# Patient Record
Sex: FEMALE | Race: ASIAN | NOT HISPANIC OR LATINO | Employment: FULL TIME | URBAN - METROPOLITAN AREA
[De-identification: names, ages, dates, MRNs, and addresses within clinical notes are randomized per-mention and may not be internally consistent; named-entity substitution may affect disease eponyms.]

---

## 2018-10-05 ENCOUNTER — OFFICE VISIT - HEALTHEAST (OUTPATIENT)
Dept: FAMILY MEDICINE | Facility: CLINIC | Age: 33
End: 2018-10-05

## 2018-10-05 DIAGNOSIS — Z71.84 TRAVEL ADVICE ENCOUNTER: ICD-10-CM

## 2018-10-05 DIAGNOSIS — R52 PAIN: ICD-10-CM

## 2018-10-05 ASSESSMENT — MIFFLIN-ST. JEOR: SCORE: 1026.9

## 2019-03-20 ENCOUNTER — COMMUNICATION - HEALTHEAST (OUTPATIENT)
Dept: FAMILY MEDICINE | Facility: CLINIC | Age: 34
End: 2019-03-20

## 2019-04-02 ENCOUNTER — OFFICE VISIT - HEALTHEAST (OUTPATIENT)
Dept: FAMILY MEDICINE | Facility: CLINIC | Age: 34
End: 2019-04-02

## 2019-04-02 DIAGNOSIS — Z11.1 SCREENING EXAMINATION FOR PULMONARY TUBERCULOSIS: ICD-10-CM

## 2019-04-02 ASSESSMENT — MIFFLIN-ST. JEOR: SCORE: 1024.07

## 2019-04-03 LAB
GAMMA INTERFERON BACKGROUND BLD IA-ACNC: 0.07 IU/ML
M TB IFN-G BLD-IMP: NEGATIVE
MITOGEN IGNF BCKGRD COR BLD-ACNC: -0.02 IU/ML
MITOGEN IGNF BCKGRD COR BLD-ACNC: 0.03 IU/ML
QTF INTERPRETATION: NORMAL
QTF MITOGEN - NIL: 6.68 IU/ML

## 2019-04-30 ENCOUNTER — OFFICE VISIT - HEALTHEAST (OUTPATIENT)
Dept: FAMILY MEDICINE | Facility: CLINIC | Age: 34
End: 2019-04-30

## 2019-04-30 DIAGNOSIS — B00.1 HERPES LABIALIS: ICD-10-CM

## 2019-10-19 ENCOUNTER — OFFICE VISIT - HEALTHEAST (OUTPATIENT)
Dept: FAMILY MEDICINE | Facility: CLINIC | Age: 34
End: 2019-10-19

## 2019-10-19 DIAGNOSIS — N30.00 ACUTE CYSTITIS WITHOUT HEMATURIA: ICD-10-CM

## 2019-10-19 DIAGNOSIS — R39.9 UTI SYMPTOMS: ICD-10-CM

## 2019-10-19 LAB
ALBUMIN UR-MCNC: ABNORMAL MG/DL
APPEARANCE UR: ABNORMAL
BACTERIA #/AREA URNS HPF: ABNORMAL HPF
BILIRUB UR QL STRIP: NEGATIVE
COLOR UR AUTO: YELLOW
GLUCOSE UR STRIP-MCNC: NEGATIVE MG/DL
HGB UR QL STRIP: ABNORMAL
KETONES UR STRIP-MCNC: NEGATIVE MG/DL
LEUKOCYTE ESTERASE UR QL STRIP: ABNORMAL
NITRATE UR QL: NEGATIVE
PH UR STRIP: 7.5 [PH] (ref 5–8)
RBC #/AREA URNS AUTO: ABNORMAL HPF
SP GR UR STRIP: 1.02 (ref 1–1.03)
SQUAMOUS #/AREA URNS AUTO: ABNORMAL LPF
UROBILINOGEN UR STRIP-ACNC: ABNORMAL
WBC #/AREA URNS AUTO: ABNORMAL HPF
WBC CLUMPS #/AREA URNS HPF: PRESENT /[HPF]

## 2019-10-19 ASSESSMENT — MIFFLIN-ST. JEOR: SCORE: 1043.63

## 2019-10-22 LAB
BACTERIA SPEC CULT: ABNORMAL
BACTERIA SPEC CULT: ABNORMAL

## 2020-01-31 ENCOUNTER — OFFICE VISIT - HEALTHEAST (OUTPATIENT)
Dept: FAMILY MEDICINE | Facility: CLINIC | Age: 35
End: 2020-01-31

## 2020-01-31 DIAGNOSIS — R10.84 ABDOMINAL PAIN, GENERALIZED: ICD-10-CM

## 2020-01-31 DIAGNOSIS — Z71.84 TRAVEL ADVICE ENCOUNTER: ICD-10-CM

## 2020-01-31 ASSESSMENT — MIFFLIN-ST. JEOR: SCORE: 1059.22

## 2020-02-29 ENCOUNTER — OFFICE VISIT - HEALTHEAST (OUTPATIENT)
Dept: FAMILY MEDICINE | Facility: CLINIC | Age: 35
End: 2020-02-29

## 2020-02-29 DIAGNOSIS — J06.9 VIRAL UPPER RESPIRATORY TRACT INFECTION: ICD-10-CM

## 2020-06-08 ENCOUNTER — OFFICE VISIT - HEALTHEAST (OUTPATIENT)
Dept: FAMILY MEDICINE | Facility: CLINIC | Age: 35
End: 2020-06-08

## 2020-06-08 ENCOUNTER — COMMUNICATION - HEALTHEAST (OUTPATIENT)
Dept: FAMILY MEDICINE | Facility: CLINIC | Age: 35
End: 2020-06-08

## 2020-06-08 DIAGNOSIS — N63.0 LUMP OR MASS IN BREAST: ICD-10-CM

## 2020-06-11 ENCOUNTER — HOSPITAL ENCOUNTER (OUTPATIENT)
Dept: MAMMOGRAPHY | Facility: CLINIC | Age: 35
Discharge: HOME | End: 2020-06-11
Attending: FAMILY MEDICINE

## 2020-06-11 DIAGNOSIS — N63.0 LUMP OR MASS IN BREAST: ICD-10-CM

## 2020-08-31 ENCOUNTER — OFFICE VISIT - HEALTHEAST (OUTPATIENT)
Dept: FAMILY MEDICINE | Facility: CLINIC | Age: 35
End: 2020-08-31

## 2020-08-31 DIAGNOSIS — Z23 NEED FOR PROPHYLACTIC VACCINATION AND INOCULATION AGAINST INFLUENZA: ICD-10-CM

## 2020-08-31 DIAGNOSIS — Z12.4 CERVICAL CANCER SCREENING: ICD-10-CM

## 2020-08-31 DIAGNOSIS — Z11.3 SCREEN FOR STD (SEXUALLY TRANSMITTED DISEASE): ICD-10-CM

## 2020-08-31 DIAGNOSIS — H65.91 RIGHT NON-SUPPURATIVE OTITIS MEDIA: ICD-10-CM

## 2020-08-31 DIAGNOSIS — N64.9 BREAST LESION: ICD-10-CM

## 2020-08-31 ASSESSMENT — MIFFLIN-ST. JEOR: SCORE: 1062.62

## 2020-09-01 LAB
HPV SOURCE: NORMAL
HUMAN PAPILLOMA VIRUS 16 DNA: NEGATIVE
HUMAN PAPILLOMA VIRUS 18 DNA: NEGATIVE
HUMAN PAPILLOMA VIRUS FINAL DIAGNOSIS: NORMAL
HUMAN PAPILLOMA VIRUS OTHER HR: NEGATIVE
SPECIMEN DESCRIPTION: NORMAL

## 2020-09-02 ENCOUNTER — HOSPITAL ENCOUNTER (OUTPATIENT)
Dept: MAMMOGRAPHY | Facility: CLINIC | Age: 35
Discharge: HOME | End: 2020-09-02
Attending: FAMILY MEDICINE

## 2020-09-02 DIAGNOSIS — N64.9 BREAST LESION: ICD-10-CM

## 2020-09-02 LAB
C TRACH DNA SPEC QL PROBE+SIG AMP: NEGATIVE
N GONORRHOEA DNA SPEC QL NAA+PROBE: NEGATIVE

## 2020-09-03 ENCOUNTER — AMBULATORY - HEALTHEAST (OUTPATIENT)
Dept: MAMMOGRAPHY | Facility: CLINIC | Age: 35
End: 2020-09-03

## 2020-09-03 DIAGNOSIS — Z11.59 ENCOUNTER FOR SCREENING FOR OTHER VIRAL DISEASES: ICD-10-CM

## 2020-09-12 ENCOUNTER — AMBULATORY - HEALTHEAST (OUTPATIENT)
Dept: FAMILY MEDICINE | Facility: CLINIC | Age: 35
End: 2020-09-12

## 2020-09-12 DIAGNOSIS — Z11.59 ENCOUNTER FOR SCREENING FOR OTHER VIRAL DISEASES: ICD-10-CM

## 2020-09-14 ENCOUNTER — COMMUNICATION - HEALTHEAST (OUTPATIENT)
Dept: SCHEDULING | Facility: CLINIC | Age: 35
End: 2020-09-14

## 2020-09-14 ENCOUNTER — OFFICE VISIT - HEALTHEAST (OUTPATIENT)
Dept: FAMILY MEDICINE | Facility: CLINIC | Age: 35
End: 2020-09-14

## 2020-09-14 DIAGNOSIS — G89.29 CHRONIC RIGHT EAR PAIN: ICD-10-CM

## 2020-09-14 DIAGNOSIS — H92.01 CHRONIC RIGHT EAR PAIN: ICD-10-CM

## 2020-09-14 ASSESSMENT — MIFFLIN-ST. JEOR: SCORE: 1059.22

## 2020-09-16 ENCOUNTER — HOSPITAL ENCOUNTER (OUTPATIENT)
Dept: MAMMOGRAPHY | Facility: CLINIC | Age: 35
Discharge: HOME | End: 2020-09-16
Attending: FAMILY MEDICINE

## 2020-09-16 DIAGNOSIS — N64.9 BREAST LESION: ICD-10-CM

## 2020-09-21 ENCOUNTER — COMMUNICATION - HEALTHEAST (OUTPATIENT)
Dept: FAMILY MEDICINE | Facility: CLINIC | Age: 35
End: 2020-09-21

## 2020-09-22 ENCOUNTER — COMMUNICATION - HEALTHEAST (OUTPATIENT)
Dept: MAMMOGRAPHY | Facility: CLINIC | Age: 35
End: 2020-09-22

## 2020-09-23 ENCOUNTER — COMMUNICATION - HEALTHEAST (OUTPATIENT)
Dept: MAMMOGRAPHY | Facility: CLINIC | Age: 35
End: 2020-09-23

## 2020-09-25 ENCOUNTER — OFFICE VISIT - HEALTHEAST (OUTPATIENT)
Dept: AUDIOLOGY | Facility: CLINIC | Age: 35
End: 2020-09-25

## 2020-09-25 ENCOUNTER — OFFICE VISIT - HEALTHEAST (OUTPATIENT)
Dept: OTOLARYNGOLOGY | Facility: CLINIC | Age: 35
End: 2020-09-25

## 2020-09-25 DIAGNOSIS — Z01.10 HEARING WITHIN NORMAL LIMITS IN BOTH EARS: ICD-10-CM

## 2020-09-25 DIAGNOSIS — H93.11 TINNITUS OF RIGHT EAR: ICD-10-CM

## 2020-09-25 DIAGNOSIS — H93.11 TINNITUS, RIGHT: ICD-10-CM

## 2020-09-28 LAB
LAB AP CHARGES (HE HISTORICAL CONVERSION): NORMAL
PATH REPORT.COMMENTS IMP SPEC: NORMAL
PATH REPORT.COMMENTS IMP SPEC: NORMAL
PATH REPORT.FINAL DX SPEC: NORMAL
PATH REPORT.GROSS SPEC: NORMAL
PATH REPORT.MICROSCOPIC SPEC OTHER STN: NORMAL
PATH REPORT.MICROSCOPIC SPEC OTHER STN: NORMAL
PATH REPORT.RELEVANT HX SPEC: NORMAL
RESULT FLAG (HE HISTORICAL CONVERSION): NORMAL

## 2020-09-29 ENCOUNTER — COMMUNICATION - HEALTHEAST (OUTPATIENT)
Dept: MAMMOGRAPHY | Facility: CLINIC | Age: 35
End: 2020-09-29

## 2020-09-30 ENCOUNTER — AMBULATORY - HEALTHEAST (OUTPATIENT)
Dept: FAMILY MEDICINE | Facility: CLINIC | Age: 35
End: 2020-09-30

## 2020-09-30 DIAGNOSIS — N63.0 BREAST MASS: ICD-10-CM

## 2020-10-05 ENCOUNTER — HOSPITAL ENCOUNTER (OUTPATIENT)
Dept: CT IMAGING | Facility: HOSPITAL | Age: 35
Discharge: HOME | End: 2020-10-05
Attending: OTOLARYNGOLOGY

## 2020-10-05 DIAGNOSIS — H93.11 TINNITUS, RIGHT: ICD-10-CM

## 2020-10-16 ENCOUNTER — COMMUNICATION - HEALTHEAST (OUTPATIENT)
Dept: SURGERY | Facility: CLINIC | Age: 35
End: 2020-10-16

## 2020-10-16 ENCOUNTER — HOSPITAL ENCOUNTER (OUTPATIENT)
Dept: SURGERY | Facility: CLINIC | Age: 35
Discharge: HOME | End: 2020-10-16
Attending: FAMILY MEDICINE

## 2020-10-16 ENCOUNTER — AMBULATORY - HEALTHEAST (OUTPATIENT)
Dept: SURGERY | Facility: AMBULATORY SURGERY CENTER | Age: 35
End: 2020-10-16

## 2020-10-16 DIAGNOSIS — Z11.59 ENCOUNTER FOR SCREENING FOR OTHER VIRAL DISEASES: ICD-10-CM

## 2020-10-16 DIAGNOSIS — N64.89 PSEUDOANGIOMATOUS STROMAL HYPERPLASIA OF BREAST: ICD-10-CM

## 2020-10-16 ASSESSMENT — MIFFLIN-ST. JEOR: SCORE: 1056.95

## 2020-10-26 ENCOUNTER — AMBULATORY - HEALTHEAST (OUTPATIENT)
Dept: LAB | Facility: CLINIC | Age: 35
End: 2020-10-26

## 2020-10-26 DIAGNOSIS — Z11.59 ENCOUNTER FOR SCREENING FOR OTHER VIRAL DISEASES: ICD-10-CM

## 2020-10-28 ENCOUNTER — COMMUNICATION - HEALTHEAST (OUTPATIENT)
Dept: SCHEDULING | Facility: CLINIC | Age: 35
End: 2020-10-28

## 2020-10-28 ASSESSMENT — MIFFLIN-ST. JEOR: SCORE: 1061.49

## 2020-10-29 ENCOUNTER — ANESTHESIA - HEALTHEAST (OUTPATIENT)
Dept: SURGERY | Facility: AMBULATORY SURGERY CENTER | Age: 35
End: 2020-10-29

## 2020-10-30 ENCOUNTER — SURGERY - HEALTHEAST (OUTPATIENT)
Dept: SURGERY | Facility: AMBULATORY SURGERY CENTER | Age: 35
End: 2020-10-30

## 2020-10-30 ASSESSMENT — MIFFLIN-ST. JEOR: SCORE: 1061.49

## 2020-11-11 ENCOUNTER — VIRTUAL VISIT (OUTPATIENT)
Dept: FAMILY MEDICINE | Facility: OTHER | Age: 35
End: 2020-11-11

## 2020-11-12 ENCOUNTER — AMBULATORY - HEALTHEAST (OUTPATIENT)
Dept: FAMILY MEDICINE | Facility: CLINIC | Age: 35
End: 2020-11-12

## 2020-11-12 DIAGNOSIS — Z20.822 SUSPECTED 2019 NOVEL CORONAVIRUS INFECTION: ICD-10-CM

## 2020-11-12 NOTE — PROGRESS NOTES
"Date: 2020 15:47:13  Clinician: Sisi Brooks  Clinician NPI: [id number]  Patient: Peterson Cormier  Patient : 1985  Patient Address: [address] [place] [address]  Patient Phone: [phone]  Visit Protocol: URI  Patient Summary:  Peterson is a 35 year old ( : 1985 ) female who initiated a [facility] Visit for COVID-19 (Coronavirus) evaluation and screening. When asked the question \"Please sign me up to receive news, health information and promotions from [facility].\", Peterson responded \"No\".    When asked when her symptoms started, Peterson reported that she does not have any symptoms.   She denies taking antibiotic medication in the past month and having recent facial or sinus surgery in the past 60 days.    Pertinent COVID-19 (Coronavirus) information  Peterson does not work or volunteer as healthcare worker or a . In the past 14 days, Peterson has not worked or volunteered at a healthcare facility or group living setting.   In the past 14 days, she also has not lived in a congregate living setting.   Peterson has had a close contact with a laboratory-confirmed COVID-19 patient in the last 14 days. She was exposed at her work. 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 I received a paper from the company that I was one of the close contact to the person that got COVID-19. I would like to get test if I an infected with it cause I have kids at home.   Peterson is not living in the same household with the COVID-19 positive patient. She was in an enclosed space for greater than 15 minutes with the COVID-19 patient.   During the encounter, both of them were wearing masks.   Since 2019, Peterson has been tested for COVID-19 and has not had upper respiratory " infection or influenza-like illness.      Result of COVID-19 test: Negative     Date of her COVID-19 test: 10/26/2020      Pertinent medical history  Peterson does not get yeast infections when she takes antibiotics.   Peterson needs a return to work/school note.   Weight: 105 lbs   Peterson does not smoke or use smokeless tobacco.   She denies pregnancy and denies breastfeeding. She has menstruated in the past month.   Weight: 105 lbs    MEDICATIONS: No current medications, ALLERGIES: NKDA  Clinician Response:  Dear Peterson,   Based on your exposure to COVID-19 (coronavirus), we would like to test you for this virus.  1. Please call [phone] to schedule your visit. Explain that you were referred by [facility] to have a COVID-19 test. Be ready to share your [facility] visit ID number.  * If you need to schedule in [facility] please call [phone] or for [place] employees please call [phone].   * If you need to schedule in the [place] area please call [phone]. [employer] employees call [phone].   The following will serve as your written order for this COVID Test, ordered by me, for the indication of suspected COVID [Z20.828]: The test will be ordered in [employer], our electronic health record, after you are scheduled. It will show as ordered and authorized by Alonzo Pugh MD.  Order: COVID-19 (coronavirus) PCR for ASYMPTOMATIC EXPOSURE testing from [facility].   If you know you have had close contact with someone who tested positive, you should be quarantined for 14 days after this exposure. You should stay in quarantine for the14 days even if the covid test is negative, the optimal time to test after exposure is 5-7 days from the exposure  Quarantine means   What should I do?  For safety, it's very important to follow these rules. Do this for 14 days after the date you were last exposed to the virus..  Stay home and away from others. Don't go to school or anywhere else. Generally quarantine means staying home from work but  there are some exceptions to this. Please contact your workplace.   No hugging, kissing or shaking hands.  Don't let anyone visit.  Cover your mouth and nose with a mask, tissue or washcloth to avoid spreading germs.  Wash your hands and face often. Use soap and water.  What are the symptoms of COVID-19?  The most common symptoms are cough, fever and trouble breathing. Less common symptoms include headache, body aches, fatigue (feeling very tired), chills, sore throat, stuffy or runny nose, diarrhea (loose poop), loss of taste or smell, belly pain, and nausea or vomiting (feeling sick to your stomach or throwing up).  After 14 days, if you have still don't have symptoms, you likely don't have this virus.  If you develop symptoms, follow these guidelines.  If you're normally healthy: Please start another [facility] visit to report your symptoms. Go to [email or web address].  If you have a serious health problem (like cancer, heart failure, an organ transplant or kidney disease): Call your specialty clinic. Let them know that you might have COVID-19.  2. When it's time for your COVID test:  Stay at least 6 feet away from others. (If someone will drive you to your test, stay in the backseat, as far away from the  as you can.)  Cover your mouth and nose with a mask, tissue or washcloth.  Go straight to the testing site. Don't make any stops on the way there or back.  Please note  Caregivers in these groups are at risk for severe illness due to COVID-19:  o People 65 years and older  o People who live in a nursing home or long-term care facility  o People with chronic disease (lung, heart, cancer, diabetes, kidney, liver, immunologic)  o People who have a weakened immune system, including those who:  Are in cancer treatment  Take medicine that weakens the immune system, such as corticosteroids  Had a bone marrow or organ transplant  Have an immune deficiency  Have poorly controlled HIV or AIDS  Are obese (body mass index of 40 or  higher)  Smoke regularly  Where can I get more information?  [facility] -- About COVID-19: [email or web address]  CDC -- What to Do If You're Sick: www.cdc.gov/coronavirus/2019-ncov/about/steps-when-sick.html  CDC -- Ending Home Isolation: www.cdc.gov/coronavirus/2019-ncov/hcp/disposition-in-home-patients.html  [facility] -- Caring for Someone: www.cdc.gov/coronavirus/2019-ncov/if-you-are-sick/care-for-someone.html  [facility] -- Interim Guidance for Hospital Discharge to Home: www.[facility].mn.us/diseases/coronavirus/hcp/hospdischarge.pdf  [facility] clinical trials (COVID-19 research studies): clinicalaffairs.[facility]-clinical-trials  Below are the COVID-19 hotlines at the [place] [facility] ([facility]). Interpreters are available.  For health questions: Call 891-244-7332 or 1-733.711.2201 (7 a.m. to 7 p.m.)  For questions about schools and childcare: Call 231-860-3087 or 1-777.347.2974 (7 a.m. to 7 p.m.)    Diagnosis: Contact with and (suspected) exposure to other communicable diseases  Diagnosis ICD: Z20.89

## 2020-11-15 ENCOUNTER — COMMUNICATION - HEALTHEAST (OUTPATIENT)
Dept: SCHEDULING | Facility: CLINIC | Age: 35
End: 2020-11-15

## 2021-03-05 ENCOUNTER — AMBULATORY - HEALTHEAST (OUTPATIENT)
Dept: NURSING | Facility: CLINIC | Age: 36
End: 2021-03-05

## 2021-03-26 ENCOUNTER — AMBULATORY - HEALTHEAST (OUTPATIENT)
Dept: NURSING | Facility: CLINIC | Age: 36
End: 2021-03-26

## 2021-05-27 NOTE — PROGRESS NOTES
Assessment: /    Plan:    1. Screening examination for pulmonary tuberculosis  QTF-Mycobacterium tuberculosis by QuantiFERON-TB Gold Plus    JI RED       She will  a copy of the result from the clinic.      Subjective:    HPI:  Peterson Cormier is a 33-year-old female presenting for TB screening.  Her father recently had heart transplant at [facility].  He has returned home.  Family members need to have documentation of TB status in order to be able to visit him, due to his immunosuppression.    Patient was born in [facility].  She came to the [place] in [place].  She was treated with INH at that time.  QuantiFERON was negative in 2012.    She has not been exposed to anyone with TB.      Review of Systems: No cough, fever, sweats, weight loss.      Current Outpatient Medications   Medication Sig Dispense Refill     acetaminophen (TYLENOL) 325 MG tablet Take 1 tablet (325 mg total) by mouth every 6 (six) hours as needed for pain. 120 tablet 12     No current facility-administered medications for this visit.          Objective:    Vitals:    04/02/19 0718   BP: 90/60   Pulse: 77   Resp: 17   Temp: 98.2  F (36.8  C)   SpO2: 98%       Gen:  NAD, VSS  Lungs:  normal  Heart:  normal          ADDITIONAL HISTORY SUMMARIZED (2): None.  DECISION TO OBTAIN EXTRA INFORMATION (1): None.   RADIOLOGY TESTS (1): None.  LABS (1): QuantiFERON.  MEDICINE TESTS (1): None.  INDEPENDENT REVIEW (2 each): None.     Total Data Points: 1

## 2021-05-30 ENCOUNTER — RECORDS - HEALTHEAST (OUTPATIENT)
Dept: ADMINISTRATIVE | Facility: CLINIC | Age: 36
End: 2021-05-30

## 2021-05-31 ENCOUNTER — RECORDS - HEALTHEAST (OUTPATIENT)
Dept: ADMINISTRATIVE | Facility: CLINIC | Age: 36
End: 2021-05-31

## 2021-06-02 VITALS — WEIGHT: 96.38 LBS | BODY MASS INDEX: 20.23 KG/M2 | HEIGHT: 58 IN

## 2021-06-02 VITALS — HEIGHT: 58 IN | WEIGHT: 95.75 LBS | BODY MASS INDEX: 20.1 KG/M2

## 2021-06-02 NOTE — PROGRESS NOTES
Chief Complaint   Patient presents with     Urinary Tract Infection     painful urination and when urinating small amount of urine comes out. Started since Thursday night.       HPI:  Peterson Cormier is a 34 y.o. female who presents today complaining of dysuria that started 2 days ago.  Patient is also experiencing urinary frequency, but when she does go to the bathroom only a small amount of urine comes out.  She denies any fever, abdominal pain, nausea, vomiting, flank pain, hematuria, or vaginal complaints.  She has previously had a urinary tract infection and this feels very similar.  Pregnancy.  She does not have any concerns for STDs.    History obtained from the patient.    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 Regular cycles q 30 days; flow 7 days (days 1-3 heavy, last 4 days light)          [facility] examination     - 12 prenatal: Strongyloides Ab NEG, quantiferon NEG     Type A blood, Rh positive 8/3/12, 1/12/15    - 8/3/12, 1/12/15 prenatal: blood type A POS, Ab Screen NEG       Social History     Tobacco Use     Smoking status: Never Smoker     Smokeless tobacco: Never Used   Substance Use Topics  "    Alcohol use: No       Review of Systems   Constitutional: Negative for fever.   Gastrointestinal: Negative for abdominal pain, nausea and vomiting.   Genitourinary: Positive for dysuria and frequency. Negative for flank pain, hematuria, vaginal discharge and vaginal pain.       Vitals:    10/19/19 1433   BP: 101/61   Patient Site: Right Arm   Patient Position: Sitting   Cuff Size: Adult Small   Pulse: 72   Temp: 98.1  F (36.7  C)   TempSrc: Oral   SpO2: 98%   Weight: 100 lb 1 oz (45.4 kg)   Height: 4' 10\" (1.473 m)       Physical Exam  Vitals signs and nursing note reviewed.   Constitutional:       General: She is not in acute distress.     Appearance: She is well-developed. She is not diaphoretic.   HENT:      Right Ear: External ear normal.      Left Ear: External ear normal.   Eyes:      Conjunctiva/sclera: Conjunctivae normal.   Pulmonary:      Effort: Pulmonary effort is normal. No respiratory distress.   Abdominal:      General: There is no distension.      Palpations: Abdomen is soft.      Tenderness: There is no tenderness. There is no guarding.   Psychiatric:         Behavior: Behavior normal.         Thought Content: Thought content normal.         Judgement: Judgment normal.         Labs:  Recent Results (from the past 72 hour(s))   Urinalysis-UC if Indicated   Result Value Ref Range    Color, UA Yellow Colorless, Yellow, Straw, Light Yellow    Clarity, UA Slightly Cloudy (!) Clear    Glucose, UA Negative Negative    Bilirubin, UA Negative Negative    Ketones, UA Negative Negative    Specific Gravity, UA 1.020 1.005 - 1.030    Blood, UA Moderate (!) Negative    pH, UA 7.5 5.0 - 8.0    Protein, UA 30 mg/dL (!) Negative mg/dL    Urobilinogen, UA 1.0 E.U./dL 0.2 E.U./dL, 1.0 E.U./dL    Nitrite, UA Negative Negative    Leukocytes, UA Small (!) Negative    Bacteria, UA Few (!) None Seen hpf    RBC, UA 5-10 (!) None Seen, 0-2 hpf    WBC, UA 25-50 (!) None Seen, 0-5 hpf    Squam Epithel, UA 5-10 (!) None " Seen, 0-5 lpf    WBC Clumps Present (!) None Seen       Clinical Decision Making:  UA does seem supportive of urinary tract infection today.  Patient was started on Macrobid for uncomplicated UTI.  Urine culture is pending.  At the end of the encounter, I discussed results, diagnosis, medications. Discussed red flags for immediate return to clinic/ER, as well as indications for follow up if no improvement. Patient understood and agreed to plan. Patient was stable for discharge.    1. Acute cystitis without hematuria  nitrofurantoin, macrocrystal-monohydrate, (MACROBID) 100 MG capsule   2. UTI symptoms  Urinalysis-UC if Indicated    Culture, Urine         Patient Instructions   1. Increase fluid intake  2. Complete antibiotic regimen as prescribed. You will be notified if the treatment plan needs to be changed based on the urine culture results.   3. Follow if you have a fever of 100.4 F (38 C) or higher, no improvement after three days of treatment, trouble urinating because of pain,new or increased discharge from the vagina, rash or joint pain, Increased back or abdominal pain.

## 2021-06-02 NOTE — PATIENT INSTRUCTIONS - HE
1. Increase fluid intake  2. Complete antibiotic regimen as prescribed. You will be notified if the treatment plan needs to be changed based on the urine culture results.   3. Follow if you have a fever of 100.4 F (38 C) or higher, no improvement after three days of treatment, trouble urinating because of pain,new or increased discharge from the vagina, rash or joint pain, Increased back or abdominal pain.

## 2021-06-03 VITALS — BODY MASS INDEX: 20.54 KG/M2 | WEIGHT: 98.3 LBS

## 2021-06-03 VITALS
OXYGEN SATURATION: 98 % | HEART RATE: 72 BPM | DIASTOLIC BLOOD PRESSURE: 61 MMHG | WEIGHT: 100.06 LBS | SYSTOLIC BLOOD PRESSURE: 101 MMHG | BODY MASS INDEX: 21.01 KG/M2 | HEIGHT: 58 IN | TEMPERATURE: 98.1 F

## 2021-06-04 VITALS
DIASTOLIC BLOOD PRESSURE: 44 MMHG | WEIGHT: 103.5 LBS | HEART RATE: 78 BPM | HEIGHT: 58 IN | SYSTOLIC BLOOD PRESSURE: 86 MMHG | OXYGEN SATURATION: 98 % | TEMPERATURE: 98 F | BODY MASS INDEX: 21.73 KG/M2

## 2021-06-04 VITALS
RESPIRATION RATE: 16 BRPM | SYSTOLIC BLOOD PRESSURE: 90 MMHG | OXYGEN SATURATION: 99 % | BODY MASS INDEX: 21.88 KG/M2 | HEART RATE: 68 BPM | TEMPERATURE: 98.3 F | DIASTOLIC BLOOD PRESSURE: 64 MMHG | WEIGHT: 104.25 LBS | HEIGHT: 58 IN

## 2021-06-04 VITALS
OXYGEN SATURATION: 98 % | HEART RATE: 77 BPM | WEIGHT: 102.1 LBS | BODY MASS INDEX: 21.34 KG/M2 | SYSTOLIC BLOOD PRESSURE: 92 MMHG | TEMPERATURE: 98 F | DIASTOLIC BLOOD PRESSURE: 58 MMHG

## 2021-06-05 VITALS
WEIGHT: 103.5 LBS | RESPIRATION RATE: 16 BRPM | BODY MASS INDEX: 21.73 KG/M2 | HEIGHT: 58 IN | HEART RATE: 61 BPM | DIASTOLIC BLOOD PRESSURE: 60 MMHG | TEMPERATURE: 98.5 F | OXYGEN SATURATION: 99 % | SYSTOLIC BLOOD PRESSURE: 100 MMHG

## 2021-06-05 VITALS — HEIGHT: 58 IN | BODY MASS INDEX: 21.83 KG/M2 | WEIGHT: 104 LBS

## 2021-06-05 VITALS — WEIGHT: 103 LBS | BODY MASS INDEX: 21.62 KG/M2 | HEIGHT: 58 IN

## 2021-06-05 NOTE — PROGRESS NOTES
ASSESSMENT AND PLAN:  1. 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  She is visited this region before less than 2 years ago.  Her immunizations are up-to-date she has a valid typhoid immunization.  She does want to use doxycycline for chemoprophylaxis against malaria.  [facility] handout given regarding travel to [facility]  She no longer has any abdominal discomfort  - doxycycline (VIBRAMYCIN) 100 MG capsule; Start medication 1 week before travel, take during trip.  Take 1 capsule daily for 1 months after returning  Dispense: 67 capsule; Refill: 0        2.  Abdominal pain generalized    She was seen in emergency room yesterday.  She currently has no abdominal pain.  Lab results were reviewed no abnormal findings were noted.    No orders of the defined types were placed in this encounter.    There are no discontinued medications.    No follow-ups on file.    CHIEF COMPLAINT:  Chief Complaint   Patient presents with     Travel Consult     3/1-3/30 to [facility]        HISTORY OF PRESENT ILLNESS:  Peterson is a 34 y.o. female who presents to the clinic today for a travel consult. She has been to [facility] before, and will be traveling there from 03/01/2020 - 03/30/2020. The patient plans to spend time only in [facility]. She did receive a flu shot this season.     Peterson was seen in [facility] ED yesterday for right-sided flank pain. Point of care ultrasound showed questionable hydronephrosis so Toradol, Tylenol, dramamine, and Zofran were given along with IV fluids. CT abdomen showed normal appendix without any kidney stones or hydronephrosis. Urine workup was not suggestive of UTI or hydronephroesis. CRP was normal. No evidence of leukocytosis. Her abdominal pain improved upon reassessment. Kidney stone that passed was suspected. Her flank pain has resolved at this time.    REVIEW OF SYSTEMS:   GI: No flank or abdominal pain.  : No dysuria.   All other systems are  "negative.    PFSH:  She is . She will be traveling to [facility] from 03/01/2020 - 03/30/2020. Reviewed as below.     TOBACCO USE:  Social History     Tobacco Use   Smoking Status Never Smoker   Smokeless Tobacco Never Used       VITALS:  Vitals:    01/31/20 1223   BP: (!) 86/44   Patient Site: Left Arm   Patient Position: Sitting   Cuff Size: Adult Regular   Pulse: 78   Temp: 98  F (36.7  C)   TempSrc: Oral   SpO2: 98%   Weight: 103 lb 8 oz (46.9 kg)   Height: 4' 10\" (1.473 m)     Wt Readings from Last 3 Encounters:   01/31/20 103 lb 8 oz (46.9 kg)   01/29/20 101 lb (45.8 kg)   10/20/19 100 lb (45.4 kg)     Body mass index is 21.63 kg/m .      PHYSICAL EXAM:  General: Alert, cooperative, no distress, appears stated age  Head: Normocephalic, without obvious abnormality, atraumatic, moist mucous membranes  Eyes: PERRL, conjunctiva/cornea clear, EOM's intact  Neck: Supple, no cervical lymph node enlargement   Lungs: Clear to auscultation bilaterally, respirations unlabored  Heart: Regular rate and rhythm, S1 and S2 normal, no murmur, rub, or gallop  Neurologic:  A & O x 3.  No tremor, no focal findings.  Normal gait.   Psychiatric: Normal affect, good eye contact, well-groomed  Skin: No rash or suspicious lesions noted on exposed skin, non-diaphoretic    DATA REVIEWED:  Additional History from Old Records Summarized (2): Reviewed [facility] ED note from yesterday regarding right flank pain.  Decision to Obtain Records (1): none  Radiology Tests Summarized or Ordered (1): 01/29/2020 CT abdomen and pelvis showed normal appendix and no renal calculi or hydronephrosis.  Labs Reviewed or Ordered (1): [facility] ED labs reviewed from yesterday.  Medicine Test Summarized or Ordered (1): none  Independent Review of EKG or X-RAY(2 each): none    Qi MIRZA, am scribing for and in the presence of, Dr. Mcqueen.    Dr. Richar MIRZA, personally performed the services described in this documentation, as scribed by Qi Clarke in " my presence, and it is both accurate and complete.      MEDICATIONS:  Current Outpatient Medications   Medication Sig Dispense Refill     acetaminophen (TYLENOL) 325 MG tablet Take 1 tablet (325 mg total) by mouth every 6 (six) hours as needed for pain. 120 tablet 12     doxycycline (VIBRAMYCIN) 100 MG capsule Start medication 1 week before travel, take during trip.  Take 1 capsule daily for 1 months after returning 67 capsule 0     No current facility-administered medications for this visit.        Total Data Points: 4    Please note that this clinical encounter uses voice recognition software, there may be typographical errors present

## 2021-06-06 NOTE — PROGRESS NOTES
Walk In Care Note                                                        Date of Visit: 2/29/2020     Chief Complaint   Peterson Cormier is a(n) 34 y.o.  female who presents to Walk In Care with the following complaint(s):  Nasal Congestion (sneezing and nasal congestion. x 3days)       Assessment and Plan   1. Viral upper respiratory tract infection      Healthy patient presenting with 3-day history of nasal congestion and sneezing in the absence of fever / chills, body aches, cough / shortness of breath, and other signs / symptoms of illness. Testing for strep and influenza is not indicated based on history and examination findings. Patient does not meet criteria to pursue testing for COVID-19. Discussed symptomatic / supportive cares, including use of over the counter nasal saline spray and pseudoephedrine as needed for congestion.     Counseled patient regarding assessment and plan for evaluation and treatment. Questions were answered. See AVS for the specific written instructions and educational handout(s) regarding viral URI that were provided at the conclusion of the visit.     Discussed signs / symptoms that warrant urgent / emergent medical attention.     Follow up within 3 days if symptoms persist or worsen.      History of Present Illness   Primary symptom: Flu / Cold / Cough  Onset: 3 days ago  Progression: Persisting  Fevers: No  Chills: No  Sore throat: No  Nasal congestion: Yes, and sneezing  Rhinorrhea: No  Ear pain: No  Headache: No  Body aches: No  Cough: No  Shortness of breath: No  GI symptoms: None  Additional symptoms: None  Home therapies utilized: Theraflu  Underlying lung disease: No  Exposure to influenza: No  Exposure to strep: No  Recent travel: No. No known exposure to recent foreign travelers. Is leaving for [place] tomorrow to attend [employer].      Review of Systems   Review of Systems   All other systems reviewed and are negative.       Physical Exam   Vitals:    02/29/20 1208    BP: 92/58   Patient Site: Right Arm   Patient Position: Sitting   Cuff Size: Adult Regular   Pulse: 77   Temp: 98  F (36.7  C)   TempSrc: Oral   SpO2: 98%   Weight: 102 lb 1.6 oz (46.3 kg)     Physical Exam  Vitals signs and nursing note reviewed.   Constitutional:       General: She is not in acute distress.     Appearance: She is well-developed and normal weight. She is not ill-appearing, toxic-appearing or diaphoretic.   HENT:      Head: Normocephalic and atraumatic.      Right Ear: Tympanic membrane, ear canal and external ear normal.      Left Ear: Ear canal and external ear normal. Tympanic membrane is scarred.      Nose: Mucosal edema present. No rhinorrhea.      Right Sinus: No maxillary sinus tenderness or frontal sinus tenderness.      Left Sinus: No maxillary sinus tenderness or frontal sinus tenderness.      Mouth/Throat:      Mouth: Mucous membranes are moist. No oral lesions.      Pharynx: Uvula midline. No oropharyngeal exudate or posterior oropharyngeal erythema.      Tonsils: No tonsillar exudate. 1+ on the right. 1+ on the left.   Eyes:      General: Lids are normal.      Conjunctiva/sclera: Conjunctivae normal.   Neck:      Musculoskeletal: Neck supple. No edema or erythema.   Cardiovascular:      Rate and Rhythm: Normal rate and regular rhythm.      Heart sounds: S1 normal and S2 normal. No murmur. No friction rub. No gallop.    Pulmonary:      Effort: Pulmonary effort is normal.      Breath sounds: Normal breath sounds. No stridor. No wheezing, rhonchi or rales.   Lymphadenopathy:      Cervical: No cervical adenopathy.   Skin:     General: Skin is warm and dry.      Coloration: Skin is not pale.      Findings: No rash.   Neurological:      General: No focal deficit present.      Mental Status: She is alert and oriented to person, place, and time.          Diagnostic Studies   Laboratory:  N/A  Radiology:  N/A  Electrocardiogram:  N/A     Procedure Note   N/A     Pertinent History   The  following portions of the patient's history were reviewed and updated as appropriate: allergies, current medications, past family history, past medical history, past social history, past surgical history and problem list.    Patient has Vitamin D Deficiency; Uses birth control; History of anemia; Anemia in pregnancy; and Constipation during pregnancy on their problem list.    Patient has a past medical history of Chickenpox (childhood), Immune to hepatitis A (8/3/12), Immune to varicella (8/3/12), Menarche (17 y/o), Refugee health examination, and Type A blood, Rh positive (8/3/12, 1/12/15).    Patient has a past surgical history that includes Tubal ligation (Bilateral, 8/13/15) and Postpartum tubal ligation (Bilateral, 8/13/2015).    Patient's family history includes No Medical Problems in her daughter, son, son, and son; Other in her unknown relative.    Patient reports that she has never smoked. She has never used smokeless tobacco. She reports that she does not drink alcohol or use drugs.     Portions of this note have been dictated using voice recognition software. Any grammatical or contextual distortions are unintentional and inherent to the software.    Alexis Key MD  [facility]

## 2021-06-08 NOTE — PROGRESS NOTES
"Peterson Cormier is a 35 y.o. female who is being evaluated via a billable video visit.      The patient has been notified of following:     \"This video visit will be conducted via a call between you and your physician/provider. We have found that certain health care needs can be provided without the need for an in-person physical exam.  This service lets us provide the care you need with a video conversation.  If a prescription is necessary we can send it directly to your pharmacy.  If lab work is needed we can place an order for that and you can then stop by our lab to have the test done at a later time.    Video visits are billed at different rates depending on your insurance coverage. Please reach out to your insurance provider with any questions.    If during the course of the call the physician/provider feels a video visit is not appropriate, you will not be charged for this service.\"    Patient has given verbal consent to a Video visit? Yes    Patient would like to receive their AVS by AVS Preference: Asif.    Patient would like the video invitation sent by: Text to cell phone: [phone]    Will anyone else be joining your video visit? No    Video Start Time: 3:38 PM    Peterson has a painful lump in her left breast, on the lateral side, \"by the nipple.\" She first noticed it last week. She has had no discharge from nipple. The lump is the size of her pinky finger tip. No redness of the skin. The lump only feels tender if she touches it or bumps it against something.     No family history of breast cancer. No recent breastfeeding- youngest child is 5 years old.     GENERAL: Healthy, alert and no distress  EYES: Eyes grossly normal to inspection. No discharge or erythema, or obvious scleral/conjunctival abnormalities.  RESP: No audible wheeze, cough, or visible cyanosis.  No visible retractions or increased work of breathing.    NEURO: Cranial nerves grossly intact. Mentation and speech appropriate for age.  PSYCH: " Mentation appears normal, affect normal/bright, judgement and insight intact, normal speech and appearance well-groomed    Peterson was seen today for breast pain.    Diagnoses and all orders for this visit:    Lump or mass in breast: unable to do an exam of the lump today since she was doing a video visit, but I ordered a breast ultrasound to better characterize the lump. Will move forward based on results.   -     US Breast Left Limited 1-3 Quadrants; Future        Video-Visit Details    Type of service:  Video Visit    Video End Time (time video stopped): 3:44 PM  Originating Location (pt. Location): Home    Distant Location (provider location): Home.    Platform used for Video Visit: Hira Cottrell MD

## 2021-06-11 NOTE — PROGRESS NOTES
"  LATHA Cormier is a 35 y.o. female here for follow up on right ear pain. She took a course of augmentin for what appeared to be otitis media at our last appointment. The ear is no longer painful but she hears a \"Whooshing\" in it.     Today, she tells me that at age 18 or 19 years old, in [place], she couldn't hear anything in her right tear. Had a tube placed and then it fell out a couple years later. She never had any problems after that until now.     She has a breast biopsy scheduled for her left breast lump     Past Medical History:   Diagnosis Date     Chickenpox childhood    patient reports h/o chickenpox as small child     Immune to hepatitis A 8/3/12    - 8/3/12 prenatal: Hep A IMMUNE (unknown whether h/o vaccination vs immune by h/o disease)     Immune to varicella 8/3/12    - 8/3/12 prenatal: Varicella IMMUNE     Menarche 17 y/o    Menarche 17 y/o. Regular cycles q 30 days; flow 7 days (days 1-3 heavy, last 4 days light)          Refugee health examination     - 9/5/12 prenatal: Strongyloides Ab NEG, quantiferon NEG     Type A blood, Rh positive 8/3/12, 1/12/15    - 8/3/12, 1/12/15 prenatal: blood type A POS, Ab Screen NEG     No current outpatient medications on file prior to visit.     No current facility-administered medications on file prior to visit.        Past medical and social history reviewed with no changes.     ?  O  /60   Pulse 61   Temp 98.5  F (36.9  C) (Oral)   Resp 16   Ht 4' 10\" (1.473 m)   Wt 103 lb 8 oz (46.9 kg)   LMP 08/24/2020 (Exact Date)   SpO2 99% Comment: ra  Breastfeeding No   BMI 21.63 kg/m     Vitals reviewed. Nursing note reviewed.  General Appearance: Pleasant and alert, in no acute distress  HEENT: mucous membranes moist. Right TM is dull with what appears to be scarring on the skin surrounding.   Skin: warm, dry, intact, no rash noted  Neuro: no focal deficits, CNs II-XII normal.   Psych: mood and affect are normal.    A/NESSA Winkler was seen today for " follow-up.    Diagnoses and all orders for this visit:    Chronic right ear pain: the discomfort with intermittent pain and a feeling of fluid in the ear has been present for months now. Will refer to ENT. Will try the following to see if it helps calm some inflammation.   -     Ambulatory referral to ENT  -     acetic acid-hydrocortisone (VOSOL-HC) otic solution; Administer 5 drops to the right ear 4 (four) times a day for 7 days.         Return in about 1 year (around 9/14/2021) for Annual physical.      Options for treatment and follow-up care were reviewed with the patient and/or guardian. Peterson Hang and/or guardian engaged in the decision making process and verbalized understanding of the options discussed and agreed with the final plan.    Anabel Cottrell MD

## 2021-06-11 NOTE — TELEPHONE ENCOUNTER
Central PA team  [phone]  Pool: HE PA MED ([id number])          PA has been initiated.       PA form completed and faxed insurance via Cover My Meds     Key:  [id number]     Medication:  acetic acid-hydrocortisone    Insurance:  [employer]        Response will be received via fax and may take up to 5-10 business days depending on plan

## 2021-06-11 NOTE — TELEPHONE ENCOUNTER
Prior Authorization Request  Who s requesting:  Pharmacy  Pharmacy Name and Location: [id number] - [place], [place] - [address] AT [facility] [address]   Medication Name: acetic acid-hydrocortisone (VOSOL-HC) otic solution   Insurance Plan: [employer]  Insurance Member ID Number:  [id number]  CoverMyMeds Key: N/A  Informed patient that prior authorizations can take up to 10 business days for response:   No  Okay to leave a detailed message: No

## 2021-06-11 NOTE — PROGRESS NOTES
"CHIEF COMPLAINT:   Chief Complaint   Patient presents with     Tinnitus     \"Whooshing\" sound in right ear x 2-3 months. No ear pain, no hearing loss.         HISTORY OF PRESENT ILLNESS    Peterson was seen at the behest of Anabel Cottrell for   Chief Complaint   Patient presents with     Tinnitus     \"Whooshing\" sound in right ear x 2-3 months. No ear pain, no hearing loss.       Patient states that she has a whooshing type sound in her ear for the last 2 or 3 months.  She has not experienced the sound of the last month.  Triggers include turning the head from side to side bending over exercising or walking.  She has experiences some dull pain with it.  No vertigo or dizziness.  She is has no history of otologic disease.  She did have otitis media as a child and had an ear tube placed in the right ear as a child.  No other ear nose or throat related concerns today.     REVIEW OF SYSTEMS    Review of Systems: a 10-system review is reviewed at this encounter.  See scanned document.     Patient has no known allergies.     PHYSICAL EXAM:        HEAD: Normal appearance and symmetry:  No cutaneous lesions.      EARS:  Right: circumferential myingosclerosis, middle ear aerated   LEFT:  Normal EAC and TM    Auscultation of the right neck was negative for bruits.         NOSE:    Dorsum:   straight  Septum:  Normal  Mucosa:  moist  Inferior turbinates:  2+       ORAL CAVITY/OROPHARYNX:    Lips:  Normal.  Tongue: normal, midline  Mucosa:   no lesions  Tonsils:  2+      NECK:  Trachea:  midline.   Thyroid:  normal   Adenopathy:  none       NEURO:   Alert and Oriented    GAIT AND STATION:  normal     RESPIRATORY:   Symmetry and Respiratory effort         IMPRESSION:    Encounter Diagnoses   Name Primary?     Tinnitus, right Yes     Hearing within normal limits in both ears           RECOMMENDATIONS:  Symptoms are somewhat nonspecific but may be consistent with pulsatile tinnitus.  We will start off with a CT of the temporal bones.  If " "this symptom persists he may also want to consider either a CTA or MRA.  We will discuss this further 1 month when the patient returns.  All questions were answered.  She is agreeable to this plan of care.    Orders Placed This Encounter   Procedures     CT Internal Auditory Canals Without Contrast     Patient has \"whoosing sound\" in the right ear when turns her head, walking, jumping, or bending over)     Standing Status:   Future     Standing Expiration Date:   9/25/2021     Order Specific Question:   Is the patient pregnant?     Answer:   No     Order Specific Question:   Can the procedure be changed per Radiologist protocol?     Answer:   Yes     Order Specific Question:   If this is a diagnostic procedure, have the patient's age and recent imaging history been considered?     Answer:   Yes              "

## 2021-06-11 NOTE — PROGRESS NOTES
"LATHA Cormier is a 35 y.o. female here for several concerns.    She has had pain in her right ear for the past several days.  For several weeks before this, she was noticing some \"whooshing\" in her ear, but had no pain.  The pain just started a few days ago.    She still has a painful breast lump in the outside of her left breast.  She had an ultrasound for this in June and it was thought to be a hematoma, but it has not gone away and she feels like it is a little bit bigger.    She is also due for her Pap and is willing to do this today.  Past Medical History:   Diagnosis Date     Chickenpox childhood    patient reports h/o chickenpox as small child     Immune to hepatitis A 8/3/12    - 8/3/12 prenatal: Hep A IMMUNE (unknown whether h/o vaccination vs immune by h/o disease)     Immune to varicella 8/3/12    - 8/3/12 prenatal: Varicella IMMUNE     Menarche 17 y/o    Menarche 17 y/o. Regular cycles q 30 days; flow 7 days (days 1-3 heavy, last 4 days light)          Refugee health examination     - 9/5/12 prenatal: Strongyloides Ab NEG, quantiferon NEG     Type A blood, Rh positive 8/3/12, 1/12/15    - 8/3/12, 1/12/15 prenatal: blood type A POS, Ab Screen NEG     No current outpatient medications on file prior to visit.     No current facility-administered medications on file prior to visit.        Past medical and social history reviewed with no changes.   ?  ROS:  No fever or chills  No cough or shortness of breath  ?  O  BP 90/64   Pulse 68   Temp 98.3  F (36.8  C) (Oral)   Resp 16   Ht 4' 10\" (1.473 m)   Wt 104 lb 4 oz (47.3 kg)   LMP 08/24/2020 (Exact Date)   SpO2 99% Comment: ra  Breastfeeding No   BMI 21.79 kg/m     Vitals reviewed. Nursing note reviewed.  General Appearance: Pleasant and alert, in no acute distress  HEENT: mucous membranes moist.  Right TM is dull and slightly bulging, with some surrounding sclerosis.  Left TM appears normal.  Breast: On the left breast, there is a firm lump around " 3:00, approximately 1 cm in diameter.  Tender on palpation.  CV: RRR, no murmur, rubs, gallops  Resp: No respiratory distress. Clear to auscultation bilaterally. No wheezes, rales, rhonchi  Abd: Soft, nontender, nondistended, bowel sounds present. No masses.  Pelvic:    Vulva: normal skin.  No lesions noted.  Nontender.    Vagina: normal appearance, physiologic discharge. No evidence of cystocele or rectocele.   Cervix: normal appearance, no lesions, no cervical motion tenderness   Ext: No peripheral edema, good distal perfusion  Skin: warm, dry, intact, no rash noted  Neuro: no focal deficits, CNs II-XII normal.   Psych: mood and affect are normal.    A/P  Peterson was seen today for ear pain.    Diagnoses and all orders for this visit:    Right non-suppurative otitis media: she does appear to have otitis and since the ear is painful, will treat with augmentin  -     amoxicillin-clavulanate (AUGMENTIN) 875-125 mg per tablet; Take 1 tablet by mouth 2 (two) times a day for 10 days.    Need for prophylactic vaccination and inoculation against influenza  -     Influenza, Seasonal Quad, PF =/> 6months    Cervical cancer screening  -     Gynecologic Cytology (PAP Smear)  -     HPV High Risk DNA Cervical    Breast lesion: will order diagnostic mammo/ultrasound. Will likely need biopsy since this is getting larger.   -     Cancel: Mammo Diagnostic Left; Future    Screen for STD (sexually transmitted disease)  -     Chlamydia trachomatis & Neisseria gonorrhoeae, Amplified Detection      Return in about 2 weeks (around 9/14/2020) for recheck ear.    Options for treatment and follow-up care were reviewed with the patient and/or guardian. Peterson Hang and/or guardian engaged in the decision making process and verbalized understanding of the options discussed and agreed with the final plan.    Anabel Cottrell MD

## 2021-06-11 NOTE — PATIENT INSTRUCTIONS - HE
Your ear exam today is normal   Your hearing test is normal  I ordered a CT scan of your ear that will be done at the hospital (you will be called for an appointment)  Return visit to see me in 1 month  Keep a diary of when you experience the sound and how long it lasts, what is the trigger)

## 2021-06-12 NOTE — TELEPHONE ENCOUNTER
Spoke with Peterson today, she is scheduled for surgery on 10/30 with Dr. Reyes at [facility]. Covid Test scheduled.    Letter sent via [employer]

## 2021-06-12 NOTE — PROGRESS NOTES
"Peterson presents to [facility] [place] [facility] today for a surgical consult with Dr. Reyes  regarding a left breast mass.  Patient found this mass,  Had imaging and results came back that it was PASH. She would like it removed.  RN assessment and EMR update. Resp 16   Ht 4' 10\" (1.473 m)   Wt 103 lb (46.7 kg)   Breastfeeding No   BMI 21.53 kg/m    Patient met with Dr. Reyes .  See dictation for details of visit. She will plan surgical excision of lesion.  Pre and post op teaching, written and verbal, provided to patient.  Alexa to call patient for surgery scheduling.  Follow up pending biopsy results.  RN time 15 mins.  "

## 2021-06-12 NOTE — ANESTHESIA CARE TRANSFER NOTE
Last vitals:   Vitals:    10/30/20 1136   BP: 100/64   Pulse: 65   Resp: 16   Temp: 36.3  C (97.3  F)   SpO2: 100%     Patient's level of consciousness is drowsy  Spontaneous respirations: yes  Maintains airway independently: yes  Dentition unchanged: yes  Oropharynx: oropharynx clear of all foreign objects    QCDR Measures:  ASA# 20 - Surgical Safety Checklist: WHO surgical safety checklist completed prior to induction    PQRS# 430 - Adult PONV Prevention: 4558F - Pt received => 2 anti-emetic agents (different classes) preop & intraop  ASA# 8 - Peds PONV Prevention: NA - Not pediatric patient, not GA or 2 or more risk factors NOT present  PQRS# 424 - Luanne-op Temp Management: 4559F - At least one body temp DOCUMENTED => 35.5C or 95.9F within required timeframe  PQRS# 426 - PACU Transfer Protocol: - Transfer of care checklist used  ASA# 14 - Acute Post-op Pain: ASA14B - Patient did NOT experience pain >= 7 out of 10

## 2021-06-12 NOTE — PROGRESS NOTES
Chief Complaint:  Left Breast Mass or Lesion    HPI:  This is a 35 y.o. woman who I'm asked to see by Anabel Cottrell MD for evaluation of a left breast mass.  This was picked up by the patient.  She underwent a mammogram and ultrasound.   A needle biopsy was done that showed PASH. She has no family history of breast cancer.      Past Medical History:  Past Medical History:   Diagnosis Date     Chickenpox childhood    patient reports h/o chickenpox as small child     Immune to hepatitis A 8/3/12    - 8/3/12 prenatal: Hep A IMMUNE (unknown whether h/o vaccination vs immune by h/o disease)     Immune to varicella 8/3/12    - 8/3/12 prenatal: Varicella IMMUNE     Menarche 17 y/o    Menarche 17 y/o. Regular cycles q 30 days; flow 7 days (days 1-3 heavy, last 4 days light)          [facility] health examination     - 9/5/12 prenatal: Strongyloides Ab NEG, quantiferon NEG     Type A blood, Rh positive 8/3/12, 1/12/15    - 8/3/12, 1/12/15 prenatal: blood type A POS, Ab Screen NEG     Past Surgical History:   Procedure Laterality Date     POSTPARTUM TUBAL LIGATION Bilateral 8/13/2015    Procedure: TUBAL LIGATION POST PARTUM, BILATERAL;  Surgeon: Ginna Brennan MD;  Location: [facility];  Service:      TUBAL LIGATION Bilateral 8/13/15    - 8/13/15 Dr Brennan, [facility]: postpartum BTL, no complications     US BREAST CORE BIOPSY LEFT Left 9/16/2020       Meds:  No current outpatient medications on file.    Allergies:  No Known Allergies    Social History:   reports that she has never smoked. She has never used smokeless tobacco. She reports that she does not drink alcohol or use drugs.    ROS:  A 12 point comprehensive review of systems was negative except as noted.    Physical exam:  There were no vitals taken for this visit.  General: alert, appears stated age and cooperative  Lungs: clear to auscultation bilaterally  CV: regular rate and rhythm, S1, S2 normal, no murmur, click, rub or gallop  Breast: Small but clearly  palpable mass in the lateral aspect of the left breast.  Tender to palpation.  Lymph Nodes: no axillary nodes palpated  Neuro: Grossly normal        Studies: Mammograms, ultrasound are reviewed.    Impression: Left breast PASH. Discussed option of excision. Risks and benefits of surgery explained and they wish to proceed. All questions answered.    Plan: Excisional biopsy.  Typically an outpatient procedure under local MAC anesthetic.

## 2021-06-12 NOTE — ANESTHESIA PREPROCEDURE EVALUATION
Anesthesia Evaluation      Patient summary reviewed     Airway   Mallampati: III  Neck ROM: full   Pulmonary - negative ROS and normal exam                          Cardiovascular - negative ROS  Exercise tolerance: > or = 4 METS  Rhythm: regular        Neuro/Psych - negative ROS     Endo/Other    (-) not pregnant     GI/Hepatic/Renal - negative ROS      Other findings:     NPO > 8 hrs     COVID neg 10/26      Dental - normal exam                        Anesthesia Plan  Planned anesthetic: MAC      Propofol gtt  Robinul  Lidocaine  Zofran, decadron 4 mg  Toradol if ok with surgeon    ASA 1       Anesthesia plan special considerations: antiemetics,   Post-op plan: routine recovery

## 2021-06-12 NOTE — ANESTHESIA POSTPROCEDURE EVALUATION
Patient: Peterson Cormier  Procedure(s):  Left Breast Biopsy (Left)  Anesthesia type: MAC    Patient location: Phase II Recovery  Last vitals:   Vitals Value Taken Time   /67 10/30/20 1209   Temp 36.3  C (97.3  F) 10/30/20 1136   Pulse 64 10/30/20 1233   Resp 16 10/30/20 1209   SpO2 98 % 10/30/20 1233   Vitals shown include unvalidated device data.  Post vital signs: stable  Level of consciousness: awake and responds to simple questions  Post-anesthesia pain: pain controlled  Post-anesthesia nausea and vomiting: no  Pulmonary: unassisted, return to baseline  Cardiovascular: stable and blood pressure at baseline  Hydration: adequate  Anesthetic events: no    QCDR Measures:  ASA# 11 - Luanne-op Cardiac Arrest: ASA11B - Patient did NOT experience unanticipated cardiac arrest  ASA# 12 - Luanne-op Mortality Rate: ASA12B - Patient did NOT die  ASA# 13 - PACU Re-Intubation Rate: NA - No ETT / LMA used for case  ASA# 10 - Composite Anes Safety: ASA10A - No serious adverse event    Additional Notes:

## 2021-06-16 PROBLEM — N64.89 PSEUDOANGIOMATOUS STROMAL HYPERPLASIA OF BREAST: Status: ACTIVE | Noted: 2020-10-16

## 2021-06-17 NOTE — PATIENT INSTRUCTIONS - HE
"Patient Instructions by Glenna Dewey CNP at 4/30/2019  4:40 PM     Author: Glenna Dewey CNP Service: -- Author Type: Nurse Practitioner    Filed: 4/30/2019  5:38 PM Encounter Date: 4/30/2019 Status: Signed    : Glenan Dewey CNP (Nurse Practitioner)         Patient Education     Cold Sore (Child)  A cold sore (also called fever blister) is a common viral infection around the lips. It is caused by the herpes simplex virus. It spreads easily from person to person. People are often first exposed to the virus in childhood. Not everyone who has the virus will develop a cold sore, however.  A cold sore starts as one or more painful blisters on the lip or inside the mouth. The blisters break open and crust. They usually go away within 1 week. When your child has his or her first cold sore, he or she may also have a fever and mouth and throat pain. After the cold sore goes away, it can come back on the same spot. This is because the virus stays in the body. After the first \"outbreak,\" though, other symptoms such as fever are usually mild or don't come back.  The frequency of cold sores varies with each child. Some will never have another one. Others will have several per year. Some things that can trigger a cold sore to come back include:    Emotional stress    Another illness (cold, flu, or fever)    Heavy sun exposure    Overexertion and fatigue    Menstruation  Cold sores can be spread to other people. A child can start spreading the virus from the cold sore a few days before the sore appears. The sore remains contagious until it has gone.  Home care    If your child has been prescribed medicines, give these as the healthcare provider directs. Ask your child's healthcare provider before giving your child any over-the-counter medicines.    [place] petroleum jelly to a sore may help ease pain. Ask your child's healthcare provider before using any other creams or ointments.     For severe pain, wrap an " ice cub in a cloth and have your child apply it to the sore for a few minutes at a time. Older children may rinse the mouth with a glass of warm water mixed with a teaspoon of baking soda to relieve pain.    Avoid giving your child acidic foods (citrus fruits and tomatoes).    Teach your child not to touch the cold sore. It is important that the child does not touch the sore then touch his or her eyes. The virus can spread to the eyes.    When your child has a cold sore, have your child:  ? Wash his or her hands often.  ? Avoid kissing others.  ? Not share utensils, towels, or toothbrushes.    Clean your child's toys with a disinfectant.    Have your child wear a hat and use sunblock on his or her lips before going out in the sun.    Children with open draining lip sores should stay out of school or  until the sore forms a scab.  Follow-up care  Follow up with the child's healthcare provider as advised by our staff.  When to seek medical advice  Call the child's healthcare provider for any of the following:    Eye pain, redness, or drainage from the eye    Inability to eat or drink due to pain  Date Last Reviewed: 9/25/2015 2000-2017 [employer]. [address] [place] [address], [place] [address]. All rights reserved. This information is not intended as a substitute for professional medical care. Always follow your healthcare professional's instructions.

## 2021-06-18 NOTE — PATIENT INSTRUCTIONS - HE
Patient Instructions by Qi Clarke Scribe at 1/31/2020 12:15 PM     Author: Qi Clarke Scribe Service: -- Author Type: Falguni    Filed: 1/31/2020 12:35 PM Encounter Date: 1/31/2020 Status: Signed    : Qi Clarke Scribe (Falguni)       Travel consult:     Take malaria medication starting prior to the trip, throughout the trip, and extending past the trip as instructed.    Use mosquito repellant/insectiside for protection.    See printed CDC guidelines for further details.    Drink only bottled water.    Eat cooked produce.    Avoid exposure to sick individuals.    Wash hands often.     Use alcohol wipes.    Patient Education     Traveler's Diarrhea (Adult)    Traveler's diarrhea is an infection in the intestinal tract that is usually caused by bacteria called E coli. This bacteria is commonly found in water supplies of developing countries. The local people of those countries are used to E coli in the water and don't get sick. Tourists who drink contaminated water or eat foods that were washed or prepared with this water may become very ill.  The illness begins 1 to 3 days after exposure and lasts up to 5 days. Symptoms include fever, vomiting, stomach cramping, and watery diarrhea. There may also be blood or mucus in the stool. Mild cases will get better without treatment. Antibiotics are used for more severe cases.  Home care    If you were prescribed antibiotics, take them until they are finished.    You may use acetaminophen or ibuprofen to control fever, unless another medicine was prescribed. If you have chronic liver or kidney disease or have ever had a stomach ulcer or gastrointestinal  bleeding, talk with the healthcare provider before using these medicines. Aspirin should never be used in anyone under 18 years of age who is ill with a fever. It may cause severe illness or death.    Do not take over-the-counter antidiarrheal medicines, unless advised by the healthcare provider.  Once vomiting  stops, follow these guidelines:  During the first 12 to 24 hours, follow the diet below:    Fruit juices. Apple, grape and cranberry juice; clear fruit drinks, electrolyte replacement and sports drinks.    Beverages. Soft drinks without caffeine; mineral water (plain or flavored); decaffeinated tea and coffee    Soups. Clear broth, consommé and bouillon.    Desserts. Plain gelatin, popsicles and fruit juice bars; As you feel better, you may add 6 to 8 oz of yogurt per day.  During the next 24 hours, you may add the following to the above:    Hot cereal, plain toast, bread, rolls, or crackers    Plain noodles, rice, mashed potatoes, or chicken noodle or rice soup    Unsweetened canned fruit (avoid pineapple), bananas    Limit fat intake to less than 15 grams per day by avoiding margarine, butter, oils, mayonnaise, sauces, gravies, fried foods, peanut butter, meat, poultry, and fish.    Limit fiber; avoid raw or cooked vegetables, fresh fruits (except bananas), and bran cereals.    Limit caffeine and chocolate. No spices or seasonings except salt.  During the next 24 hours, you can gradually resume a normal diet as the symptoms lessen.  Follow-up care  Follow up with your healthcare provider, or as advised. Call if you are not improving within 24 hours or if the diarrhea lasts more than 1 week on antibiotics. If a stool (diarrhea) sample was taken, you may call in 2 days (or as directed) for the results.  When to seek medical advice  Call your healthcare provider if any of these happen:    Severe constant pain in the lower part of the belly, on the right side    Blood in diarrhea or vomit, dark coffee ground appearing vomit, or dark tarry stools    Continued vomiting (unable to keep liquids down)    Frequent diarrhea (more than 5 times a day); blood (red or black) or mucus in diarrhea    Reduced oral intake    Reduced urine output or extreme thirst    Weakness, dizziness, or fainting    Drowsiness, confusion, stiff  neck, or seizure    Fever (1 degree above your normal temperature) lasting 24 to 48 hours, or whatever your healthcare provider told you to report based on your condition  Date Last Reviewed: 11/1/2017 2000-2017 [employer]. [address] [place] [address], [place] [address]. All rights reserved. This information is not intended as a substitute for professional medical care. Always follow your healthcare professional's instructions.         Patient Education     Understanding Malaria  When a mosquito bites you, germs that can cause illness may get into your body through the bite. Some types of mosquitoes can pass on the parasite germ that causes malaria. Malaria is now rare in the [place] This is because the mosquitoes that carry it have mostly been killed off, and few people in the [place] have active malaria. Malaria is more common in other parts of the world. There are 200 million to 300 million cases of malaria in 100 countries in the world each year. Travelers to other countries are at risk for malaria.  What causes malaria?  The parasite that causes malaria is passed to people in bites from a certain type of mosquito. It may also be spread when someone gets infected blood in a transfusion, or through sharing needles. Pregnant mothers who have malaria may pass it to their babies, but this is rare.  What are the symptoms of malaria?  Malaria can have a wide variety of symptoms. These may include:    High fever    Chills and shivering    Sweating    Tiredness or feeling unwell    Headache    Cough    Body aches and restlessness    Confusion or seizures    Skin or eyes that look yellow (jaundice)    Nausea and vomiting or diarrhea  After a bite from an infected mosquito, symptoms usually show up within 3 weeks to a few months. But sometimes, they may not appear until years later. Symptoms are usually worse in very young children and travelers. People who live in areas with malaria often get a more mild disease.  Malaria can also come back after months or even years if not treated fully.  The healthcare provider will make a diagnosis of malaria based on your symptoms and a physical exam. When possible, lab tests are used to confirm the diagnosis.   How is malaria treated?  Treatment focuses on killing the parasite that causes malaria. This is done by giving you medicine to get rid of the parasite. Other treatments work on specific symptoms that each person may have.  How can I prevent malaria?  Public health steps are used worldwide to cut down on the number of mosquitoes that can spread the illness. This can be done through chemical spraying or by removing breeding areas. If you are planning to travel to places where malaria is common, talk with your healthcare provider. You may be able to take medicines to prevent malaria. Preventing mosquito bites in malaria areas also helps to prevent malaria. Here are some ways to prevent getting mosquito bites:    Put insect repellent containing DEET on exposed skin when you are outside. This is especially important in the evening. Mosquitoes that pass malaria mainly bite then. Use DEET cautiously in small children.    Wear long-sleeved shirts and long pants when outside.    Use screens on windows and mosquito netting over beds.  What are the possible complications of malaria?  Malaria can have serious complications. These can include:    Too few red blood cells (anemia). This can cause weakness and tiredness.    Damage to internal organs, especially the spleen and kidneys    Swelling of the brain or brain damage    Low blood pressure    Problems with blood chemistry, including low blood sugar    Death  When should I call my healthcare provider?  Call your healthcare provider right away if you have any of these:    Confusion or seizures    Fever of 100.4 F (38 C) or higher, or as directed by your healthcare provider    Pain that gets worse    Symptoms that dont get better with  treatment, or symptoms that get worse    New symptoms  Date Last Reviewed: 3/30/2016    4786-5316 [employer]. [address] [facility] [facility], [place] [address]. All rights reserved. This information is not intended as a substitute for professional medical care. Always follow your healthcare professional's instructions.

## 2021-06-18 NOTE — PATIENT INSTRUCTIONS - HE
Patient Instructions by Alexis Key MD at 2/29/2020 11:40 AM     Author: Aleixs Key MD Service: -- Author Type: Physician    Filed: 2/29/2020  1:04 PM Encounter Date: 2/29/2020 Status: Addendum    : Alexis Key MD (Physician)    Related Notes: Original Note by Alexis Key MD (Physician) filed at 2/29/2020  1:04 PM       -Your symptoms are consistent with a mild viral upper respiratory tract infection.  -Your symptoms are not consistent with strep throat, influenza, or COVID-19.  -You do not meet criteria for testing for COVID-19 based on your symptoms and travel history.  -Recommend using over-the-counter nasal saline spray and pseudoephedrine (Sudafed) as needed for congestion.  Patient Education     Viral Upper Respiratory Illness (Adult)  You have a viral upper respiratory illness (URI), which is another term for the common cold. This illness is contagious during the first few days. It is spread through the air by coughing and sneezing. It may also be spread by direct contact (touching the sick person and then touching your own eyes, nose, or mouth). Frequent handwashing will decrease risk of spread. Most viral illnesses go away within 7 to 10 days with rest and simple home remedies. Sometimes the illness may last for several weeks. Antibiotics will not kill a virus, and they are generally not prescribed for this condition.    Home care    If symptoms are severe, rest at home for the first 2 to 3 days. When you resume activity, don't let yourself get too tired.    Avoid being exposed to cigarette smoke (yours or others).    You may use acetaminophen or ibuprofen to control pain and fever, unless another medicine was prescribed. If you have chronic liver or kidney disease, have ever had a stomach ulcer or gastrointestinal bleeding, or are taking blood-thinning medicines, talk with your healthcare provider before using these medicines. Aspirin should never be given to anyone  under 18 years of age who is ill with a viral infection or fever. It may cause severe liver or brain damage.    Your appetite may be poor, so a light diet is fine. Avoid dehydration by drinking 6 to 8 glasses of fluids per day (water, soft drinks, juices, tea, or soup). Extra fluids will help loosen secretions in the nose and lungs.    Over-the-counter cold medicines will not shorten the length of time youre sick, but they may be helpful for the following symptoms: cough, sore throat, and nasal and sinus congestion. (Note: Do not use decongestants if you have high blood pressure.)  Follow-up care  Follow up with your healthcare provider, or as advised.  When to seek medical advice  Call your healthcare provider right away if any of these occur:    Cough with lots of colored sputum (mucus)    Severe headache; face, neck, or ear pain    Difficulty swallowing due to throat pain    Fever of 100.4 F (38 C) or higher, or as directed by your healthcare provider  Call 911  Call 911 if any of these occur:    Chest pain, shortness of breath, wheezing, or difficulty breathing    Coughing up blood    Inability to swallow due to throat pain  Date Last Reviewed: 9/13/2015 2000-2017 [employer]. [address] [place] [address], [place] [address]. All rights reserved. This information is not intended as a substitute for professional medical care. Always follow your healthcare professional's instructions.

## 2021-06-20 NOTE — LETTER
Letter by Mannie Mcqueen MD at      Author: Mannie Mcqueen MD Service: -- Author Type: --    Filed:  Encounter Date: 1/31/2020 Status: (Other)         January 31, 2020     Patient: Peterson Cormier   YOB: 1985   Date of Visit: 1/31/2020       To Whom It May Concern:    It is my medical opinion that Peterson Cormier may return to work on 2/3/2020. Please excuse her absence on 1/31/2020.    If you have any questions or concerns, please don't hesitate to call.    Sincerely,        Electronically signed by Mannie Mcqueen MD

## 2021-06-20 NOTE — PROGRESS NOTES
ASSESSMENT and plan   1. Travel advice encounter  She will be traveling to [facility] the end of November.  It has been more than 10 years since her last visit we discussed prophylaxis against malaria.  She will start doxycycline.  Side effects of medication discussed.  Updated her vaccines today which included a typhoid vaccine.  I gave her the [facility] handout with travel instructions to [facility].  All her questions were answered.  - Influenza, Seasonal Quad, Preservative Free 36+ Months    2. Pain    - ibuprofen (ADVIL,MOTRIN) 200 MG tablet; Take 1-2 tablets (200-400 mg total) by mouth every 6 (six) hours as needed for pain.  Dispense: 60 tablet; Refill: 12  - acetaminophen (TYLENOL) 325 MG tablet; Take 1 tablet (325 mg total) by mouth every 6 (six) hours as needed for pain.  Dispense: 120 tablet; Refill: 12    3. Pain    - ibuprofen (ADVIL,MOTRIN) 200 MG tablet; Take 1-2 tablets (200-400 mg total) by mouth every 6 (six) hours as needed for pain.  Dispense: 60 tablet; Refill: 12  - acetaminophen (TYLENOL) 325 MG tablet; Take 1 tablet (325 mg total) by mouth every 6 (six) hours as needed for pain.  Dispense: 120 tablet; Refill: 12        There are no Patient Instructions on file for this visit.    Orders Placed This Encounter   Procedures     Typhoid, Inactive, Inj     Influenza, Seasonal Quad, Preservative Free 36+ Months     Medications Discontinued During This Encounter   Medication Reason     ibuprofen (ADVIL,MOTRIN) 200 MG tablet Reorder     acetaminophen (TYLENOL) 325 MG tablet Reorder       No Follow-up on file.    CHIEF COMPLAINT:  Chief Complaint   Patient presents with     Travel Consult       HISTORY OF PRESENT ILLNESS:  Peterson is a 33 y.o. female     Who is scheduled to go on a vacation to [facility] at the end of November she will be there for about 30 days.  She has not been back to that part of the world for more than 10 years.  She reports she is in good health.  She plans [place] stay in urban areas.  She is not  "visiting any forms.  She reports her health is being excellent, she currently does not have any colds or coughs and reports no side effects to any medication.    REVIEW OF SYSTEMS:   10 point review of systems negative reviewed  All other systems are negative.    PFSH:    Reviewed medical history    TOBACCO USE:  History   Smoking Status     Never Smoker   Smokeless Tobacco     Never Used       VITALS:  Vitals:    10/05/18 1352   BP: 90/54   Patient Site: Left Arm   Patient Position: Sitting   Cuff Size: Adult Regular   Pulse: 64   Resp: 16   Temp: 97.9  F (36.6  C)   TempSrc: Oral   Weight: (!) 96 lb 6 oz (43.7 kg)   Height: 4' 10\" (1.473 m)     Wt Readings from Last 3 Encounters:   10/05/18 (!) 96 lb 6 oz (43.7 kg)   02/05/18 (!) 95 lb (43.1 kg)   01/27/16 (!) 97 lb (44 kg)       PHYSICAL EXAM:    Interactive  American female sitting comfortably exam room no acute distress  HEENT neck supple mucous members moist no lymph node enlargement noted in the neck  Respiratory system clear to auscultation equal breath sounds no wheezes no crackles  CVS regular rate and rhythm no murmurs rubs gallops appreciated  CNS cranial nerves II to XII intact gait is normal reflexes are brisk    DATA REVIEWED:  Additional History from Old Records Summarized (2): 0  Decision to Obtain Records (1): 0  Radiology Tests Summarized or Ordered (1): 0  Labs Reviewed or Ordered (1): 0  Medicine Test Summarized or Ordered (1): 0  Independent Review of EKG or X-RAY(2 each): 0    The visit lasted a total of 30 minutes face to face with the patient. Over 50% of the time was spent counseling and educating the patient about     Precautions while traveling.  We discussed the [facility] handout in detail.    MEDICATIONS:  Current Outpatient Prescriptions   Medication Sig Dispense Refill     acetaminophen (TYLENOL) 325 MG tablet Take 1 tablet (325 mg total) by mouth every 6 (six) hours as needed for pain. 120 tablet 12     cholecalciferol, vitamin D3, " (VITAMIN D3) 2,000 unit cap Take 1 tablet by mouth daily. 30 each 12     docusate sodium (COLACE) 100 MG capsule Take 1 capsule (100 mg total) by mouth 2 (two) times a day as needed for constipation. 60 capsule 6     doxycycline (VIBRA-TABS) 100 MG tablet Take 1 tablet daily, start medication 1 week before trip, take during trip in 1 month after returning back to [place] 67 tablet 0     hydrocortisone (ANUSOL-HC) 2.5 % rectal cream Insert into the rectum 2 (two) times a day as needed for hemorrhoids. 30 g 2     ibuprofen (ADVIL,MOTRIN) 200 MG tablet Take 1-2 tablets (200-400 mg total) by mouth every 6 (six) hours as needed for pain. 60 tablet 12     PNV with Ca,no.71-iron-FA 27-1 mg Tab Take 1 tablet by mouth daily. 30 tablet 12     witch hazel-glycerin (TUCKS) pad Apply topically every hour as needed for pain. 100 each 2     No current facility-administered medications for this visit.      Please note that this clinical encounter uses voice recognition software, there may be typographical errors present

## 2021-06-21 NOTE — LETTER
Letter by Alexa Rey CMA at      Author: Alexa Rey CMA Service: -- Author Type: --    Filed:  Encounter Date: 10/16/2020 Status: (Other)       We've received instruction to get you scheduled for Left Breast Biopsy with Dr Reyes. We have that arranged as follows:     Surgery Date: 10/30/2020    Location: [facility]                 3rd Floor, [facility]                  [address] [place] [id number]     Arrival Time: 10:30 am (unless instructed otherwise by the preop nurse)    Prep:     1. Schedule a preop physical with your primary care doctor. This may be virtual or face-to-face depending on your doctors preference fyi. Call them right away to schedule this.    2. COVID19 testing is required within 4 days of surgery. We have this scheduled for you at [facility], [address] [place] [address], 1st Floor at 3:30 pm on 10/26/2020. Follow the specific instructions you receive by Asif. If your test is positive, your surgery will be canceled.     3. Nothing to eat or drink for 8 hours before surgery unless instructed differently by the preop nurse.    4. No blood thinners including aspirin for one week prior to surgery. Verify this is safe for you with your primary care doctor before stopping.     5. You need an adult to drive you home and stay with you 24 hours after surgery. Because of COVID19 related visitor restrictions, surgical patients are allowed one visitor only during the preoperative phase of surgery.    6. When you arrive to the hospital, you will be screened for COVID19 symptoms. If you screen positive, your surgery will need to be postponed for your safety.    7. If the community sees a new surge in COVID19 hospital admissions, your procedure may need to be postponed. We will contact you if this happens.    8. We always encourage you to notify your insurance any time you have something scheduled including surgery. The number is  usually right on the back of your insurance card.         Call our office if you have any questions! Thank you!

## 2021-06-25 NOTE — TELEPHONE ENCOUNTER
Pt called an hour ago and apt scheduled with Dr. Mcqueen for 1120 am today.  Am closing message.  Thanks.

## 2021-06-25 NOTE — TELEPHONE ENCOUNTER
New Appointment Needed  What is the reason for the visit:    Mantoux Placement  Appt Request  What is the purpose of the mantoux?:  The patient just wants to get checked for TB.  Is there a form to be completed?:   No  How soon do you need the mantoux placed?:  date: As soon as possible.    Provider Preference: Any available  How soon do you need to be seen?: As soon as possible  Waitlist offered?: No  Okay to leave a detailed message:  Yes

## 2021-06-27 NOTE — PROGRESS NOTES
Progress Notes by Glenna Dewey CNP at 4/30/2019  4:40 PM     Author: Glenna Dewey CNP Service: -- Author Type: Nurse Practitioner    Filed: 4/30/2019  5:43 PM Encounter Date: 4/30/2019 Status: Signed    : Glenna Dewey CNP (Nurse Practitioner)       Chief Complaint   Patient presents with   ? mouth dry sore and itchy sore on creases     started last night       ASSESSMENT & PLAN:   Diagnoses and all orders for this visit:    Herpes labialis  -     valACYclovir (VALTREX) 1000 MG tablet; Take 2 tablets (2,000 mg total) by mouth 2 (two) times a day for 1 day.  Dispense: 4 tablet; Refill: 0        MDM:  Exam and description of discomfort with exposure to relative the same-most likely herpes labialis.    Given education to not share items, that this is contagious, avoid kissing children or  until better.    Ibuprofen, ice packs, Vaseline to sore area.    Supportive care discussed.  See discharge instructions below for specific recommendations given.    At the end of the encounter, I discussed results, diagnosis, medications. Discussed red flags for immediate return to clinic/ER, as well as indications for follow up if no improvement. Patient and/or caregiver understood and agreed to plan. Patient was stable for discharge.    SUBJECTIVE    HPI:  Blistering, erythematous bumps on far lateral upper right lip that burns and itches x 1 day.  Patient's mother had something similar recently and was treated without recurrence.       History obtained from the patient.    Past Medical History:   Diagnosis Date   ? Chickenpox childhood    patient reports h/o chickenpox as small child   ? Immune to hepatitis A 8/3/12    - 8/3/12 prenatal: Hep A IMMUNE (unknown whether h/o vaccination vs immune by h/o disease)   ? Immune to varicella 8/3/12    - 8/3/12 prenatal: Varicella IMMUNE   ? Menarche 17 y/o    Menarche 17 y/o. Regular cycles q 30 days; flow 7 days (days 1-3 heavy, last 4 days light)        ? Refugee  health examination     - 9/5/12 prenatal: Strongyloides Ab NEG, quantiferon NEG   ? Type A blood, Rh positive 8/3/12, 1/12/15    - 8/3/12, 1/12/15 prenatal: blood type A POS, Ab Screen NEG       Active Ambulatory (Non-Hospital) Problems    Diagnosis   ? Constipation during pregnancy   ? Anemia in pregnancy   ? Contraception Method (s/p BTL)   ? History of anemia   ? Vitamin D Deficiency         Social History     Tobacco Use   ? Smoking status: Never Smoker   ? Smokeless tobacco: Never Used   Substance Use Topics   ? Alcohol use: No       Review of Systems   All other systems reviewed and are negative.      OBJECTIVE    Vitals:    04/30/19 1655   BP: 94/59   Patient Site: Right Arm   Patient Position: Sitting   Cuff Size: Adult Small   Pulse: 70   Resp: 16   Temp: 98.2  F (36.8  C)   TempSrc: Oral   SpO2: 99%   Weight: (!) 98 lb 4.8 oz (44.6 kg)       Physical Exam   Constitutional: She is oriented to person, place, and time. She appears well-developed and well-nourished. No distress.   HENT:   Right Ear: External ear normal.   Left Ear: External ear normal.   Mouth/Throat: Oropharynx is clear and moist and mucous membranes are normal. Oral lesions (Patch of vesicular, erythematous lesions located far right lateral upper lip.  No other lesions identified.) present. No tonsillar exudate.   Eyes: Conjunctivae are normal. Right eye exhibits no discharge. Left eye exhibits no discharge.   Cardiovascular: Intact distal pulses.   Pulmonary/Chest: Effort normal.   Musculoskeletal: Normal range of motion.   Lymphadenopathy:     She has no cervical adenopathy.   Neurological: She is alert and oriented to person, place, and time.   Skin: Skin is warm and dry. Capillary refill takes less than 2 seconds.   Psychiatric: She has a normal mood and affect. Her behavior is normal. Judgment and thought content normal.       Labs:  No results found for this or any previous visit (from the past 240 hour(s)).      Radiology:    No  "results found.    PATIENT INSTRUCTIONS:   Patient Instructions     Patient Education     Cold Sore (Child)  A cold sore (also called fever blister) is a common viral infection around the lips. It is caused by the herpes simplex virus. It spreads easily from person to person. People are often first exposed to the virus in childhood. Not everyone who has the virus will develop a cold sore, however.  A cold sore starts as one or more painful blisters on the lip or inside the mouth. The blisters break open and crust. They usually go away within 1 week. When your child has his or her first cold sore, he or she may also have a fever and mouth and throat pain. After the cold sore goes away, it can come back on the same spot. This is because the virus stays in the body. After the first \"outbreak,\" though, other symptoms such as fever are usually mild or don't come back.  The frequency of cold sores varies with each child. Some will never have another one. Others will have several per year. Some things that can trigger a cold sore to come back include:    Emotional stress    Another illness (cold, flu, or fever)    Heavy sun exposure    Overexertion and fatigue    Menstruation  Cold sores can be spread to other people. A child can start spreading the virus from the cold sore a few days before the sore appears. The sore remains contagious until it has gone.  Home care    If your child has been prescribed medicines, give these as the healthcare provider directs. Ask your child's healthcare provider before giving your child any over-the-counter medicines.    Mobridge petroleum jelly to a sore may help ease pain. Ask your child's healthcare provider before using any other creams or ointments.     For severe pain, wrap an ice cub in a cloth and have your child apply it to the sore for a few minutes at a time. Older children may rinse the mouth with a glass of warm water mixed with a teaspoon of baking soda to relieve " Dione Khalil(Attending) pain.    Avoid giving your child acidic foods (citrus fruits and tomatoes).    Teach your child not to touch the cold sore. It is important that the child does not touch the sore then touch his or her eyes. The virus can spread to the eyes.    When your child has a cold sore, have your child:  ? Wash his or her hands often.  ? Avoid kissing others.  ? Not share utensils, towels, or toothbrushes.    Clean your child's toys with a disinfectant.    Have your child wear a hat and use sunblock on his or her lips before going out in the sun.    Children with open draining lip sores should stay out of school or  until the sore forms a scab.  Follow-up care  Follow up with the child's healthcare provider as advised by our staff.  When to seek medical advice  Call the child's healthcare provider for any of the following:    Eye pain, redness, or drainage from the eye    Inability to eat or drink due to pain  Date Last Reviewed: 9/25/2015 2000-2017 [employer]. [address] [place] [address], [place] [address]. All rights reserved. This information is not intended as a substitute for professional medical care. Always follow your healthcare professional's instructions.

## 2021-06-29 NOTE — PROGRESS NOTES
"Progress Notes by Violeta Cordova AuD at 9/25/2020  2:40 PM     Author: Violeta Cordova AuD Service: -- Author Type: Audiologist    Filed: 9/25/2020  2:58 PM Encounter Date: 9/25/2020 Status: Signed    : Violeta Cordova AuD (Audiologist)       Audiology Report    Summary: Audiology visit completed. Please see audiogram below or in \"media\" tab for case history and results.     Plan:  The patient is returned to ENT for follow up. Return for retesting with ENT recommendation.     Eagle Dickerson, [facility]  Clinical Audiologist  [place] #[id number]         "

## 2021-08-15 ENCOUNTER — HEALTH MAINTENANCE LETTER (OUTPATIENT)
Age: 36
End: 2021-08-15

## 2021-10-11 ENCOUNTER — HEALTH MAINTENANCE LETTER (OUTPATIENT)
Age: 36
End: 2021-10-11

## 2022-09-24 ENCOUNTER — HEALTH MAINTENANCE LETTER (OUTPATIENT)
Age: 37
End: 2022-09-24

## 2023-04-28 ENCOUNTER — HOSPITAL ENCOUNTER (EMERGENCY)
Facility: CLINIC | Age: 38
Discharge: HOME | End: 2023-04-28
Attending: EMERGENCY MEDICINE | Admitting: EMERGENCY MEDICINE
Payer: COMMERCIAL

## 2023-04-28 VITALS
DIASTOLIC BLOOD PRESSURE: 71 MMHG | HEIGHT: 58 IN | HEART RATE: 78 BPM | WEIGHT: 96 LBS | TEMPERATURE: 98.5 F | SYSTOLIC BLOOD PRESSURE: 95 MMHG | BODY MASS INDEX: 20.15 KG/M2 | RESPIRATION RATE: 18 BRPM | OXYGEN SATURATION: 100 %

## 2023-04-28 DIAGNOSIS — N30.00 ACUTE CYSTITIS WITHOUT HEMATURIA: ICD-10-CM

## 2023-04-28 LAB
ALBUMIN UR-MCNC: ABNORMAL G/DL
APPEARANCE UR: CLEAR
BILIRUB UR QL STRIP: ABNORMAL
COLOR UR AUTO: ABNORMAL
GLUCOSE UR STRIP-MCNC: NEGATIVE MG/DL
HCG UR QL: NEGATIVE
HGB UR QL STRIP: NEGATIVE
KETONES UR STRIP-MCNC: NEGATIVE MG/DL
LEUKOCYTE ESTERASE UR QL STRIP: NEGATIVE
NITRATE UR QL: ABNORMAL
PH UR STRIP: 8 [PH] (ref 5–7)
RBC URINE: 2 /HPF
SP GR UR STRIP: 1.02 (ref 1–1.03)
SQUAMOUS EPITHELIAL: <1 /HPF
UROBILINOGEN UR STRIP-MCNC: 3 MG/DL
WBC URINE: 4 /HPF

## 2023-04-28 PROCEDURE — 81001 URINALYSIS AUTO W/SCOPE: CPT | Performed by: EMERGENCY MEDICINE

## 2023-04-28 PROCEDURE — 81025 URINE PREGNANCY TEST: CPT | Performed by: EMERGENCY MEDICINE

## 2023-04-28 PROCEDURE — 99283 EMERGENCY DEPT VISIT LOW MDM: CPT

## 2023-04-28 RX ORDER — CEPHALEXIN 500 MG/1
500 CAPSULE ORAL 3 TIMES DAILY
Qty: 21 CAPSULE | Refills: 0 | Status: SHIPPED | OUTPATIENT
Start: 2023-04-28 | End: 2023-05-05

## 2023-04-28 NOTE — ED PROVIDER NOTES
EMERGENCY DEPARTMENT ENCOUNTER      NAME: Peterson Cormier  AGE: 38 year old female  YOB: 1985  MRN: [id number]  EVALUATION DATE & TIME: 4/28/2023  5:07 AM    PCP: Anabel Cottrell    ED PROVIDER: Rene Augustine M.D.      Chief Complaint   Patient presents with     Urinary Frequency         FINAL IMPRESSION:  1. Acute cystitis without hematuria          ED COURSE & MEDICAL DECISION MAKING:    Pertinent Labs & Imaging studies reviewed. (See chart for details)  38 year old female presents to the Emergency Department for evaluation of occulta urinating.  Symptoms seem consistent with UTI.  Urinalysis shows few white cells.  Is somewhat borderline but given her symptomatology we will treat her for UTI.  She is not pregnant.  No vaginal discharge or bleeding.  I will think this is STI.  Abdomen is otherwise nontender.  I will think this is appendicitis or other intra-abdominal pathology.  I think further work-up such as laboratory or CT scan is indicated.  We will give her Keflex.  If not improved she will follow-up with primary.  Return for worsening symptoms.    5:08 AM I met with the patient to gather history and to perform my initial exam. I discussed the plan for care while in the Emergency Department. PPE: Face mask    At the conclusion of the encounter I discussed the results of all of the tests and the disposition. The questions were answered. The patient or family acknowledged understanding and was agreeable with the care plan.     Medical Decision Making    History:    Supplemental history from: Documented in chart, if applicable    External Record(s) reviewed: Documented in chart, if applicable.    Work Up:    Chart documentation includes differential considered and any EKGs or imaging independently interpreted by provider, where specified.    In additional to work up documented, I considered the following work up: Documented in chart, if applicable.    External consultation:    Discussion of management  with another provider: Documented in chart, if applicable    Complicating factors:    Care impacted by chronic illness: N/A    Care affected by social determinants of health: N/A    Disposition considerations: Discharge. I prescribed additional prescription strength medication(s) as charted. N/A.        e     MEDICATIONS GIVEN IN THE EMERGENCY:  Medications - No data to display    NEW PRESCRIPTIONS STARTED AT TODAY'S ER VISIT  New Prescriptions    CEPHALEXIN (KEFLEX) 500 MG CAPSULE    Take 1 capsule (500 mg) by mouth 3 times daily for 7 days          =================================================================    HPI    Patient information was obtained from: Patient and       Peterson Cormier is a 38 year old female with nopertinent history who presents to this ED  for evaluation of difficulty urinating.  This started last evening.  Having frequency and painful urination.  No nausea or vomiting.  No blood in her urine.  Denies any abnormal vaginal bleeding or discharge.  She is not pregnant.  Her last period was the end of March.  No back pain.  No fevers.  Has been taking Pyridium for symptoms.  Has helped mildly.          PAST MEDICAL HISTORY:  Past Medical History:   Diagnosis Date     Chickenpox childhood    patient reports h/o chickenpox as small child     Immune to hepatitis A 8/3/12    - 8/3/12 prenatal: Hep A IMMUNE (unknown whether h/o vaccination vs immune by h/o disease)     Immune to varicella 8/3/12    - 8/3/12 prenatal: Varicella IMMUNE     Menarche 17 y/o    Menarche 17 y/o. Regular cycles q 30 days; flow 7 days (days 1-3 heavy, last 4 days light)          Refugee health examination     - 9/5/12 prenatal: Strongyloides Ab NEG, quantiferon NEG     Type A blood, Rh positive 8/3/12, 1/12/15    - 8/3/12, 1/12/15 prenatal: blood type A POS, Ab Screen NEG       PAST SURGICAL HISTORY:  Past Surgical History:   Procedure Laterality Date     BIOPSY OF BREAST, INCISIONAL Left 10/30/2020    Procedure: Left  Breast Biopsy;  Surgeon: Heather Reyes MD;  Location: [facility] OR;  Service: General     POSTPARTUM TUBAL LIGATION Bilateral 2015    Procedure: TUBAL LIGATION POST PARTUM, BILATERAL;  Surgeon: Ginna Brennan MD;  Location: [facility] Main OR;  Service:      TUBAL LIGATION Bilateral 8/13/15    - 8/13/15 Dr Brennan, [facility]: postpartum BTL, no complications     US BREAST CORE BIOPSY LEFT Left 2020           CURRENT MEDICATIONS:    No current facility-administered medications for this encounter.     Current Outpatient Medications   Medication     cephALEXin (KEFLEX) 500 MG capsule     HYDROcodone-acetaminophen 5-325 mg per tablet         ALLERGIES:  No Known Allergies    FAMILY HISTORY:  Family History   Problem Relation Age of Onset     Other - See Comments Other         - 2013: No known family h/o DM, heart disease, cancers, or deaths < 49 y/o. Parents, 5 brothers/1 sister (she is #4 of 7), 4 children alive & well          No Known Problems Daughter          13     No Known Problems Son          10/4/10     No Known Problems Son          09     No Known Problems Son          08     Breast Cancer No family hx of        SOCIAL HISTORY:   Social History     Socioeconomic History     Marital status: Single     Number of children: 4     Years of education: HS grad   Tobacco Use     Smoking status: Never     Smokeless tobacco: Never   Substance and Sexual Activity     Alcohol use: No     Drug use: No     Sexual activity: Yes     Partners: Male     Birth control/protection: Surgical   Social History Narrative    Notes per PCP, Dr Trevino 2015:  HOME ENVIRONMENT: - 2013: Lives with , 3 sons ( 2008, 2009, 10/2010) & daughter ( 2013), no pets, house with 4 bedrooms and stairs MARITAL HISTORY: - Culturally  to [place] since  EDUCATION: - Completed [employer] in , HS diploma -  [employer]      NATIVE  "LANGUAGE: - Hmong = 1st language (cannot read/write) - Barbadian = fluent (read/write) - English = fluent (read/write)   HOBBIES/LEISURE ACTIVITIES: - 8/2012: walking in the park, [place]  PERSONAL HISTORY: - POLITICAL REFUGEE. [place]. Born in [facility], spent 17 years in [place] refugee [place]. Arrived to [place] 2004. Went through refugee screening at [facility].       VITALS:  BP 95/71   Pulse 78   Temp 98.5  F (36.9  C) (Temporal)   Resp 18   Ht 1.473 m (4' 10\")   Wt 43.5 kg (96 lb)   LMP 03/29/2023   SpO2 100%   BMI 20.06 kg/m      PHYSICAL EXAM    Physical Exam  Vitals and nursing note reviewed.   Constitutional:       General: She is not in acute distress.     Appearance: She is not diaphoretic.   HENT:      Head: Atraumatic.      Mouth/Throat:      Pharynx: No oropharyngeal exudate.   Eyes:      General: No scleral icterus.     Pupils: Pupils are equal, round, and reactive to light.   Cardiovascular:      Rate and Rhythm: Normal rate and regular rhythm.      Heart sounds: Normal heart sounds.   Pulmonary:      Effort: No respiratory distress.      Breath sounds: Normal breath sounds.   Abdominal:      Palpations: Abdomen is soft.      Tenderness: There is no abdominal tenderness. There is no guarding or rebound. Negative signs include Rodriguez's sign.   Musculoskeletal:         General: No tenderness.   Skin:     General: Skin is warm.      Findings: No rash.   Neurological:      Mental Status: She is alert.           LAB:  All pertinent labs reviewed and interpreted.  Labs Ordered and Resulted from Time of ED Arrival to Time of ED Departure   ROUTINE UA WITH MICROSCOPIC REFLEX TO CULTURE - Abnormal       Result Value    Color Urine Dark Yellow (*)     Appearance Urine Clear      Glucose Urine Negative      Bilirubin Urine        Ketones Urine Negative      Specific Gravity Urine 1.023      Blood Urine Negative      pH Urine 8.0 (*)     Protein Albumin Urine        Urobilinogen Urine 3.0 (*)     Nitrite Urine     "    Leukocyte Esterase Urine Negative      RBC Urine 2      WBC Urine 4      Squamous Epithelials Urine <1     HCG QUALITATIVE URINE - Normal    hCG Urine Qualitative Negative         RADIOLOGY:  Reviewed all pertinent imaging. Please see official radiology report.  No orders to display           Rene Augustine M.D.  Emergency Medicine  [facility] EMERGENCY ROOM  [address] [id number] [phone]  Dept: [phone]     Rene Augustine MD  04/28/23 0528

## 2023-04-28 NOTE — Clinical Note
Casa was seen and treated in our emergency department on 4/28/2023.  She may return to school on 04/29/2023.      If you have any questions or concerns, please don't hesitate to call.      Rene Augustine MD

## 2023-04-28 NOTE — ED TRIAGE NOTES
Patient reports urinary frequency and pain with urination since Wednesday night. Patient has been taking pyridium for pain.      Triage Assessment     Row Name 04/28/23 2264       Triage Assessment (Adult)    Airway WDL WDL       Respiratory WDL    Respiratory WDL WDL       Skin Circulation/Temperature WDL    Skin Circulation/Temperature WDL WDL       Cardiac WDL    Cardiac WDL WDL       Peripheral/Neurovascular WDL    Peripheral Neurovascular WDL WDL       Cognitive/Neuro/Behavioral WDL    Cognitive/Neuro/Behavioral WDL WDL

## 2023-04-28 NOTE — ED NOTES
Discharge instructions discussed with patient and significant other. All questions answered and patient agreeable with discharge plan of care. VSS.

## 2023-10-14 ENCOUNTER — HEALTH MAINTENANCE LETTER (OUTPATIENT)
Age: 38
End: 2023-10-14

## 2024-12-01 ENCOUNTER — HEALTH MAINTENANCE LETTER (OUTPATIENT)
Age: 39
End: 2024-12-01

## 2024-12-17 ENCOUNTER — OFFICE VISIT (OUTPATIENT)
Dept: URGENT CARE | Facility: URGENT CARE | Age: 39
End: 2024-12-17
Payer: COMMERCIAL

## 2024-12-17 ENCOUNTER — ANCILLARY PROCEDURE (OUTPATIENT)
Dept: GENERAL RADIOLOGY | Facility: CLINIC | Age: 39
End: 2024-12-17
Attending: PHYSICIAN ASSISTANT
Payer: COMMERCIAL

## 2024-12-17 VITALS
SYSTOLIC BLOOD PRESSURE: 115 MMHG | DIASTOLIC BLOOD PRESSURE: 74 MMHG | HEART RATE: 74 BPM | RESPIRATION RATE: 16 BRPM | WEIGHT: 98 LBS | OXYGEN SATURATION: 98 % | TEMPERATURE: 98 F | BODY MASS INDEX: 20.48 KG/M2

## 2024-12-17 DIAGNOSIS — M54.12 CERVICAL RADICULOPATHY: Primary | ICD-10-CM

## 2024-12-17 PROCEDURE — 72040 X-RAY EXAM NECK SPINE 2-3 VW: CPT | Mod: TC | Performed by: RADIOLOGY

## 2024-12-17 PROCEDURE — 99204 OFFICE O/P NEW MOD 45 MIN: CPT | Performed by: PHYSICIAN ASSISTANT

## 2024-12-17 RX ORDER — CYCLOBENZAPRINE HCL 10 MG
10 TABLET ORAL
Qty: 14 TABLET | Refills: 0 | Status: SHIPPED | OUTPATIENT
Start: 2024-12-17 | End: 2024-12-31

## 2024-12-17 RX ORDER — MELOXICAM 15 MG/1
15 TABLET ORAL DAILY
Qty: 14 TABLET | Refills: 0 | Status: SHIPPED | OUTPATIENT
Start: 2024-12-17 | End: 2024-12-31

## 2024-12-17 NOTE — PROGRESS NOTES
Patient presents with:  Back Pain: Back, neck and shoulder pain    Hand Pain      Clinical Decision Making:  Cervical spine x-ray did not show spondylolisthesis or bony changes.  However the patient did have cervical radiculopathy and a C3-C4 distribution.  She is treated with Mobic for anti-inflammatory effect and pain relief and cyclobenzaprine to be used at nighttime to help with sleep and comfort.  Referral to spine center for definitive evaluation and treatment and ultimately advanced imaging. Expected course of resolution and indication for return was gone over and questions were answered to patient/parent's satisfaction before discharge.        ICD-10-CM    1. Cervical radiculopathy  M54.12 XR Cervical Spine 2/3 Views     Spine  Referral     meloxicam (MOBIC) 15 MG tablet     cyclobenzaprine (FLEXERIL) 10 MG tablet          Patient Instructions     Your neck pain is from cervical radiculopathy.     I have prescribed you a short course of antiinflammatory to help decrease the inflammation in the area of the spasm. This should help to decrease associated numbness/tingling (nerve pain). Please take as directed.    Do not take OTC ibuprofen or NSAIDs while on the Mobic. If having pain, take Tylenol up to 1,000 mg, 4 times daily.    You may use the Flexeril as needed for muscle spasm. Use caution while taking this medication, as it can make you drowsy. Do not take while driving, operating heavy machinery, or doing any activities requiring intense concentration.    Try using a heating pad and/or warm baths.    Make sure to keep moving to avoid getting stiff. See below for stretching exercises.    If you develop fever, severe pain that prevents you from activity, weakness of your arms or any other new concerning symptoms, go to the ER immediately.    Otherwise, follow up with spine clinic as written.          HPI:  Peterson Cormier is a 39 year old right-hand-dominant female who works with [employer] who  presents today for evaluation of acute exacerbation over the last 1 week of a chronic 4 to 5-month worsening history of back and neck and left shoulder pain.  Patient states she has had sharp stabbing pain that radiates from the lateral aspect of the neck to the medial border of the scapula around to the left shoulder and into the clavicle and left anterior chest wall in a C3-C4 distribution.  Patient does not have numbness and tingling or wreaking into the left or right upper extremities.  Pain has been at least an 8 out of 10 and keeps the patient awake at night.  Treatment at home is consisted of Advil 400 mg twice a day and Tylenol 1 or 2 tablets/day.    History obtained from chart review and the patient.    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 Regular cycles q 30 days; flow 7 days (days 1-3 heavy, last 4 days light)         Refugee health examination     - 12 prenatal: Strongyloides Ab NEG, quantiferon NEG    Type A blood, Rh positive 8/3/12, 1/12/15    - 8/3/12, 1/12/15 prenatal: blood type A POS, Ab Screen NEG       Social History     Tobacco Use    Smoking status: Never    Smokeless tobacco: Never    Substance Use Topics    Alcohol use: No       Review of Systems  As above in HPI otherwise negative.    Vitals:    12/17/24 1549   BP: 115/74   BP Location: Right arm   Patient Position: Sitting   Cuff Size: Adult Small   Pulse: 74   Resp: 16   Temp: 98  F (36.7  C)   TempSrc: Oral   SpO2: 98%   Weight: 44.5 kg (98 lb)       General: Patient is resting comfortably no acute distress is afebrile  HEENT: Head is normocephalic atraumatic   eyes are PERRL EOMI sclera anicteric   TMs are clear bilaterally  Throat is with mild pharyngeal wall erythema and no exudate  No cervical lymphadenopathy present  LUNGS: Clear to auscultation bilaterally  HEART: Regular rate and rhythm  Skin: Without rash non-diaphoretic  Musculoskeletal:  Rulings maneuver is positive on the left which radiates into the left lateral neck and into the left anterior chest wall and a C3-C4 distribution.    Physical Exam      Labs:  Results for orders placed or performed in visit on 12/17/24   XR Cervical Spine 2/3 Views     Status: None    Narrative    EXAM: XR CERVICAL SPINE 2/3 VIEWS  LOCATION: [facility]  DATE: 12/17/2024    INDICATION: cervical radiculpathy left  COMPARISON: None.      Impression    IMPRESSION: Straightening of upper cervical lordosis. No listhesis. Dens is normal. Vertebral body heights are maintained. No displaced fracture. No aggressive osseous abnormality. No significant degenerative changes. No extraspinal abnormality.         Radiology:  I have personally ordered and preliminarily reviewed the following xray, I have noted no bony abnormalities or spondylolisthesis on cervical x-ray.      At the end of the encounter, I discussed results, diagnosis, medications. Discussed red flags for immediate return to clinic/ER, as well as indications for follow up if no improvement. Patient understood and agreed to plan. Patient was stable for discharge.

## 2024-12-17 NOTE — PATIENT INSTRUCTIONS
Your neck pain is from cervical radiculopathy.     I have prescribed you a short course of antiinflammatory to help decrease the inflammation in the area of the spasm. This should help to decrease associated numbness/tingling (nerve pain). Please take as directed.    Do not take OTC ibuprofen or NSAIDs while on the Mobic. If having pain, take Tylenol up to 1,000 mg, 4 times daily.    You may use the Flexeril as needed for muscle spasm. Use caution while taking this medication, as it can make you drowsy. Do not take while driving, operating heavy machinery, or doing any activities requiring intense concentration.    Try using a heating pad and/or warm baths.    Make sure to keep moving to avoid getting stiff. See below for stretching exercises.    If you develop fever, severe pain that prevents you from activity, weakness of your arms or any other new concerning symptoms, go to the ER immediately.    Otherwise, follow up with spine clinic as written.

## 2024-12-18 ENCOUNTER — PATIENT OUTREACH (OUTPATIENT)
Dept: CARE COORDINATION | Facility: CLINIC | Age: 39
End: 2024-12-18
Payer: COMMERCIAL

## 2025-01-13 ENCOUNTER — OFFICE VISIT (OUTPATIENT)
Dept: FAMILY MEDICINE | Facility: CLINIC | Age: 40
End: 2025-01-13
Payer: COMMERCIAL

## 2025-01-13 VITALS
BODY MASS INDEX: 21.25 KG/M2 | OXYGEN SATURATION: 99 % | HEART RATE: 69 BPM | RESPIRATION RATE: 18 BRPM | DIASTOLIC BLOOD PRESSURE: 64 MMHG | SYSTOLIC BLOOD PRESSURE: 118 MMHG | WEIGHT: 98.5 LBS | TEMPERATURE: 97.9 F | HEIGHT: 57 IN

## 2025-01-13 DIAGNOSIS — Z83.3 FAMILY HISTORY OF DIABETES MELLITUS: ICD-10-CM

## 2025-01-13 DIAGNOSIS — M54.12 CERVICAL RADICULOPATHY: ICD-10-CM

## 2025-01-13 DIAGNOSIS — Z13.6 SCREENING FOR CARDIOVASCULAR CONDITION: ICD-10-CM

## 2025-01-13 DIAGNOSIS — Z00.00 ROUTINE GENERAL MEDICAL EXAMINATION AT A HEALTH CARE FACILITY: Primary | ICD-10-CM

## 2025-01-13 LAB
EST. AVERAGE GLUCOSE BLD GHB EST-MCNC: 100 MG/DL
HBA1C MFR BLD: 5.1 % (ref 0–5.6)

## 2025-01-13 PROCEDURE — 83036 HEMOGLOBIN GLYCOSYLATED A1C: CPT

## 2025-01-13 PROCEDURE — 99395 PREV VISIT EST AGE 18-39: CPT

## 2025-01-13 PROCEDURE — 82947 ASSAY GLUCOSE BLOOD QUANT: CPT

## 2025-01-13 PROCEDURE — 80061 LIPID PANEL: CPT

## 2025-01-13 PROCEDURE — 36415 COLL VENOUS BLD VENIPUNCTURE: CPT

## 2025-01-13 PROCEDURE — 99214 OFFICE O/P EST MOD 30 MIN: CPT | Mod: 25

## 2025-01-13 PROCEDURE — G2211 COMPLEX E/M VISIT ADD ON: HCPCS

## 2025-01-13 RX ORDER — CYCLOBENZAPRINE HCL 10 MG
10 TABLET ORAL
Qty: 30 TABLET | Refills: 0 | Status: SHIPPED | OUTPATIENT
Start: 2025-01-13

## 2025-01-13 SDOH — HEALTH STABILITY: PHYSICAL HEALTH: ON AVERAGE, HOW MANY MINUTES DO YOU ENGAGE IN EXERCISE AT THIS LEVEL?: 20 MIN

## 2025-01-13 SDOH — HEALTH STABILITY: PHYSICAL HEALTH: ON AVERAGE, HOW MANY DAYS PER WEEK DO YOU ENGAGE IN MODERATE TO STRENUOUS EXERCISE (LIKE A BRISK WALK)?: 3 DAYS

## 2025-01-13 ASSESSMENT — SOCIAL DETERMINANTS OF HEALTH (SDOH): HOW OFTEN DO YOU GET TOGETHER WITH FRIENDS OR RELATIVES?: NEVER

## 2025-01-13 NOTE — PROGRESS NOTES
Preventive Care Visit  [facility]  Chris Costa MD, Family Medicine  Jan 13, 2025      Assessment & Plan   Problem List Items Addressed This Visit       Routine general medical examination at a health care facility - Primary     -Lipid panel obtained 1/13/25. T Chol 161, LDL 95. ASCVD risk <1%  - Pap smear: Due Aug 2025 Last Pap in 8/31/20, NILM &HPV neg  -BMI 20.96 with weight 98 lb.   -Tubal ligation for birth control.  Done in 2015   - STD testing today declined (HIV, RPR, Hep C, G/C)  -Glu 85 obtained 1/13/25   - A1c 5.1 obtained 1/13/25  - Flu and covid vaccine given. TDAP due 6/2025   - Tobacco use? Does not smoke  - Alcohol use? Does not drink         Relevant Orders    Glucose (Completed)    REVIEW OF HEALTH MAINTENANCE PROTOCOL ORDERS (Completed)    Cervical radiculopathy     Cervical radiculopathy  Stable, Has had back pain for years and was recently evaluated by Tashi VENTURA at urgent care with a cervical spine xray showing cervical radiculopathy with a C3-C4 distribution. She was prescribed meloxicam, and a trial of flexeril and some hydrocodone for pain relief. She was also given a referral to the spine specialists but has not received a call from them yet. Per patient report the meloxicam provided no relief but the flexeril did offer her some mild relief. She is requesting a refill today.  Discussed with patient that due to the muscle knot felt on her back I can also offer patient a trigger point injection. Patient would like to meet with the spine specialists first before considering a trigger point injection.  -Refilled her flexeril 10mg nightly PRN  -Gave patient number to call for spine specialists          Relevant Medications    cyclobenzaprine (FLEXERIL) 10 MG tablet     Other Visit Diagnoses       Family history of diabetes mellitus        Relevant Orders    Hemoglobin A1c (Completed)    Screening for cardiovascular condition        Relevant Orders    Lipid panel  reflex to direct LDL Non-fasting (Completed)             Patient has been advised of split billing requirements and indicates understanding: Yes       Counseling  Appropriate preventive services were addressed with this patient via screening, questionnaire, or discussion as appropriate for fall prevention, nutrition, physical activity, Tobacco-use cessation, social engagement, weight loss and cognition.  Checklist reviewing preventive services available has been given to the patient.  Reviewed patient's diet, addressing concerns and/or questions.   She is at risk for lack of exercise and has been provided with information to increase physical activity for the benefit of her well-being.   Patient is at risk for social isolation and has been provided with information about the benefit of social connection.     Ra Winkler is a 39 year old, presenting for the following:  Physical (Has pain in parker and down into shoulders - x2 months )        1/13/2025     3:54 PM   Additional Questions   Roomed by ALEX EWING   Accompanied by , Josse RICHARD  Patient is here for annual physical and neck pain follow up    Cervical radiculopathy  Stable, Has had back pain for years and was recently evaluated by Tashi VENTURA at urgent care with a cervical spine xray showing cervical radiculopathy with a C3-C4 distribution. She was prescribed meloxicam, and a trial of flexeril and some hydrocodone for pain relief. She was also given a referral to the spine specialists but has not received a call from them yet. Per patient report the meloxicam provided no relief but the flexeril did offer her some mild relief. She is requesting a refill today.      Health Care Directive  Patient does not have a Health Care Directive: Discussed advance care planning with patient; however, patient declined at this time.      1/13/2025   General Health   How would you rate your overall physical health? Good   Feel stress (tense, anxious, or unable  to sleep) Not at all         1/13/2025   Nutrition   Three or more servings of calcium each day? Yes   Diet: Regular (no restrictions)   How many servings of fruit and vegetables per day? (!) 0-1   How many sweetened beverages each day? 0-1         1/13/2025   Exercise   Days per week of moderate/strenous exercise 3 days   Average minutes spent exercising at this level 20 min         1/13/2025   Social Factors   Frequency of gathering with friends or relatives Never   Worry food won't last until get money to buy more No   Food not last or not have enough money for food? No   Do you have housing? (Housing is defined as stable permanent housing and does not include staying ouside in a car, in a tent, in an abandoned building, in an overnight shelter, or couch-surfing.) Yes   Are you worried about losing your housing? No   Lack of transportation? No   Unable to get utilities (heat,electricity)? No   (!) SOCIAL CONNECTIONS CONCERN      1/13/2025   Dental   Dentist two times every year? Yes            Today's PHQ-2 Score:       1/13/2025     3:42 PM   PHQ-2 ( 1999 [employer])   Q1: Little interest or pleasure in doing things 0   Q2: Feeling down, depressed or hopeless 0   PHQ-2 Score 0    Q1: Little interest or pleasure in doing things Not at all   Q2: Feeling down, depressed or hopeless Not at all   PHQ-2 Score 0       Patient-reported           1/13/2025   Substance Use   Alcohol more than 3/day or more than 7/wk No   Do you use any other substances recreationally? No     Social History     Tobacco Use    Smoking status: Never     Passive exposure: Never    Smokeless tobacco: Never   Vaping Use    Vaping status: Never Used   Substance Use Topics    Alcohol use: No    Drug use: No          Mammogram Screening - Patient under 40 years of age: Routine Mammogram Screening not recommended.         1/13/2025   STI Screening   New sexual partner(s) since last STI/HIV test? No     History of abnormal Pap smear: No - age 30-64 HPV  with reflex Pap every 5 years recommended        Latest Ref Rng & Units 2020     5:36 PM 2015     4:00 PM   PAP / HPV   PAP Negative for squamous intraepithelial lesion or malignancy. Negative for squamous intraepithelial lesion or malignancy  Electronically signed by Huan Adan CT (ASCP) on 2020 at  2:04 PM    Negative for squamous intraepithelial lesion or malignancy  Electronically signed by Zenia Thomas CT (ASCP) on 2015 at 12:19 PM      HPV 16 DNA NEG Negative     HPV 18 DNA NEG Negative     Other HR HPV NEG Negative        Reviewed and updated as needed this visit by Provider                    Past Medical History:   Diagnosis Date    Chickenpox childhood    patient reports h/o chickenpox as small child    Immune to hepatitis A 8/3/12    - 8/3/12 prenatal: Hep A IMMUNE (unknown whether h/o vaccination vs immune by h/o disease)    Immune to varicella 8/3/12    - 8/3/12 prenatal: Varicella IMMUNE    Menarche 17 y/o    Menarche 17 y/o. Regular cycles q 30 days; flow 7 days (days 1-3 heavy, last 4 days light)         [facility] examination     - 12 prenatal: Strongyloides Ab NEG, quantiferon NEG    Type A blood, Rh positive 8/3/12, 1/12/15    - 8/3/12, 1/12/15 prenatal: blood type A POS, Ab Screen NEG     Past Surgical History:   Procedure Laterality Date    BIOPSY OF BREAST, INCISIONAL Left 10/30/2020    Procedure: Left Breast Biopsy;  Surgeon: Heather Reyes MD;  Location: [facility];  Service: General    POSTPARTUM TUBAL LIGATION Bilateral 2015    Procedure: TUBAL LIGATION POST PARTUM, BILATERAL;  Surgeon: Ginna Brennan MD;  Location: [facility] OR;  Service:     TUBAL LIGATION Bilateral 8/13/15    - 8/13/15 Dr Brennan, [facility]: postpartum BTL, no complications    US BREAST CORE BIOPSY LEFT Left 2020     OB History    Para Term  AB Living   5 5 4 1 0 5   SAB IAB Ectopic Multiple Live Births   0 0 0 0 5      # Outcome Date GA  Lbr Marcellus/2nd Weight Sex Type Anes PTL Lv   5 Term 08/12/15 37w4d / 00:01 3.033 kg (6 lb 11 oz)  Vag-Spont None N MEKHI      Birth Comments: PUSH 1 MIN. Fentanyl 100mcg IV x1. No lac. EBL 200mL (recd pit 20mU IV). GBS POS (recd PCN x2). Breast & formula feeding. ** BTL done PPD#1 **      Name: SLY CELESTIN      Apgar1: 9  Apgar5: 9   4 Term 13 37w5d 08:09 / 00:01 2.551 kg (5 lb 10 oz) F Vag-Spont None N MEKHI      Birth Comments: PUSH x1. Pit augment (max rate 6 munit/min & AROM). No pain meds, no lac, EBL 200mL. GBS NEG. No  problems. Bottlefed.      Name: Ayesha Delarosa      Apgar1: 9  Apgar5: 9   3 Term 10/04/10 39w0d 11:15 / 00:02 3.062 kg (6 lb 12 oz) M Vag-Spont EPI N MEKHI      Birth Comments: PUSH 2 MINUTES!! (delivered 22 min after AROM). Epidural, pit augment, 1st deg perineal lac, pubic symphasis diastasis, ppd#1 Hgb 7.3 ( mL)      Name: Iker Delarosa   2 Term 09 38w5d 20:45 / 00:01 2.892 kg (6 lb 6 oz) M Vag-Spont Other N MEKHI      Birth Comments: PUSH x1. Recd IV fentanyl, IUPC, 1st deg perineal lac repaired, iron def anemia      Name: Hugh Delarosa   1  08 34w1d 08:00 / 00:22 2.183 kg (4 lb 13 oz) M Vag-Spont EPI Y MEKHI      Birth Comments: PUSH 22 MIN. PPROM, pit induction due to PPROM. Baby did well, spent 2 weeks in NICU      Name: Donavan Delarosa     Lab work is in process  BP Readings from Last 3 Encounters:   25 118/64   24 115/74   23 95/71    Wt Readings from Last 3 Encounters:   25 44.7 kg (98 lb 8 oz)   24 44.5 kg (98 lb)   23 43.5 kg (96 lb)                  No Known Allergies  Recent Labs   Lab Test 25  1718 20  2259 10/20/19  0200   A1C 5.1  --   --    LDL 95  --   --    HDL 58  --   --    TRIG 39  --   --    ALT  --  11  --    CR  --  0.63 0.65   GFRESTIMATED  --  >60 >60   GFRESTBLACK  --  >60 >60   POTASSIUM  --  4.1 3.5         Objective    Exam  /64 (BP Location: Right arm, Patient Position: Sitting, Cuff Size: Adult  "Regular)   Pulse 69   Temp 97.9  F (36.6  C) (Oral)   Resp 18   Ht 1.46 m (4' 9.48\")   Wt 44.7 kg (98 lb 8 oz)   LMP 01/09/2025 (Exact Date)   SpO2 99%   BMI 20.96 kg/m     Estimated body mass index is 20.96 kg/m  as calculated from the following:    Height as of this encounter: 1.46 m (4' 9.48\").    Weight as of this encounter: 44.7 kg (98 lb 8 oz).    Physical Exam  GENERAL: alert and no distress  EYES: Eyes grossly normal to inspection, PERRL and conjunctivae and sclerae normal  HENT: ear canals and TM's normal, nose and mouth without ulcers or lesions  NECK: no adenopathy, no asymmetry, masses, or scars  RESP: lungs clear to auscultation - no rales, rhonchi or wheezes  CV: regular rate and rhythm, normal S1 S2, no S3 or S4, no murmur, click or rub, no peripheral edema  ABDOMEN: soft, nontender, no hepatosplenomegaly, no masses and bowel sounds normal  MS: no gross musculoskeletal defects noted, no edema  SKIN: no suspicious lesions or rashes  BACK: right sided upper back muscle knot palpated   PSYCH: mentation appears normal, affect normal/bright    In addition to the preventive visit, 30  minutes of the appointment were spent evaluating and developing a treatment plan for her additional concern(s).  The longitudinal plan of care for the diagnosis(es)/condition(s) as documented were addressed during this visit. Due to the added complexity in care, I will continue to support Tong in the subsequent management and with ongoing continuity of care.      Signed Electronically by: Chris Costa MD    "

## 2025-01-13 NOTE — PATIENT INSTRUCTIONS
Phone number to call for spine specialist  [phone]      Patient Education   Preventive Care Advice   This is general advice given by our system to help you stay healthy. However, your care team may have specific advice just for you. Please talk to your care team about your preventive care needs.  Nutrition  Eat 5 or more servings of fruits and vegetables each day.  Try wheat bread, brown rice and whole grain pasta (instead of white bread, rice, and pasta).  Get enough calcium and vitamin D. Check the label on foods and aim for 100% of the RDA (recommended daily allowance).  Lifestyle  Exercise at least 150 minutes each week  (30 minutes a day, 5 days a week).  Do muscle strengthening activities 2 days a week. These help control your weight and prevent disease.  No smoking.  Wear sunscreen to prevent skin cancer.  Have a dental exam and cleaning every 6 months.  Yearly exams  See your health care team every year to talk about:  Any changes in your health.  Any medicines your care team has prescribed.  Preventive care, family planning, and ways to prevent chronic diseases.  Shots (vaccines)   HPV shots (up to age 26), if you've never had them before.  Hepatitis B shots (up to age 59), if you've never had them before.  COVID-19 shot: Get this shot when it's due.  Flu shot: Get a flu shot every year.  Tetanus shot: Get a tetanus shot every 10 years.  Pneumococcal, hepatitis A, and RSV shots: Ask your care team if you need these based on your risk.  Shingles shot (for age 50 and up)  General health tests  Diabetes screening:  Starting at age 35, Get screened for diabetes at least every 3 years.  If you are younger than age 35, ask your care team if you should be screened for diabetes.  Cholesterol test: At age 39, start having a cholesterol test every 5 years, or more often if advised.  Bone density scan (DEXA): At age 50, ask your care team if you should have this scan for osteoporosis (brittle bones).  Hepatitis  C: Get tested at least once in your life.  STIs (sexually transmitted infections)  Before age 24: Ask your care team if you should be screened for STIs.  After age 24: Get screened for STIs if you're at risk. You are at risk for STIs (including HIV) if:  You are sexually active with more than one person.  You don't use condoms every time.  You or a partner was diagnosed with a sexually transmitted infection.  If you are at risk for HIV, ask about PrEP medicine to prevent HIV.  Get tested for HIV at least once in your life, whether you are at risk for HIV or not.  Cancer screening tests  Cervical cancer screening: If you have a cervix, begin getting regular cervical cancer screening tests starting at age 21.  Breast cancer scan (mammogram): If you've ever had breasts, begin having regular mammograms starting at age 40. This is a scan to check for breast cancer.  Colon cancer screening: It is important to start screening for colon cancer at age 45.  Have a colonoscopy test every 10 years (or more often if you're at risk) Or, ask your provider about stool tests like a FIT test every year or Cologuard test every 3 years.  To learn more about your testing options, visit:   .  For help making a decision, visit:   https://bit.ly/tu16062.  Prostate cancer screening test: If you have a prostate, ask your care team if a prostate cancer screening test (PSA) at age 55 is right for you.  Lung cancer screening: If you are a current or former smoker ages 50 to 80, ask your care team if ongoing lung cancer screenings are right for you.  For informational purposes only. Not to replace the advice of your health care provider. Copyright   2023 [employer]. All rights reserved. Clinically reviewed by the [facility] Transitions Program. ContentDJ [id number] - REV 01/24.

## 2025-01-14 PROBLEM — Z00.00 ROUTINE GENERAL MEDICAL EXAMINATION AT A HEALTH CARE FACILITY: Status: ACTIVE | Noted: 2025-01-14

## 2025-01-14 PROBLEM — M54.12 CERVICAL RADICULOPATHY: Status: ACTIVE | Noted: 2025-01-14

## 2025-01-14 LAB
CHOLEST SERPL-MCNC: 161 MG/DL
FASTING STATUS PATIENT QL REPORTED: NO
FASTING STATUS PATIENT QL REPORTED: NO
GLUCOSE SERPL-MCNC: 85 MG/DL (ref 70–99)
HDLC SERPL-MCNC: 58 MG/DL
LDLC SERPL CALC-MCNC: 95 MG/DL
NONHDLC SERPL-MCNC: 103 MG/DL
TRIGL SERPL-MCNC: 39 MG/DL

## 2025-01-14 NOTE — ASSESSMENT & PLAN NOTE
Stable, Has had back pain for years and was recently evaluated by Tashi VENTURA at urgent care with a cervical spine xray showing cervical radiculopathy with a C3-C4 distribution. She was prescribed meloxicam, and a trial of flexeril and some hydrocodone for pain relief. She was also given a referral to the spine specialists but has not received a call from them yet. Per patient report the meloxicam provided no relief but the flexeril did offer her some mild relief. She is requesting a refill today.  Discussed with patient that due to the muscle knot felt on her back I can also offer patient a trigger point injection. Patient would like to meet with the spine specialists first before considering a trigger point injection.  -Refilled her flexeril 10mg nightly PRN  -Gave patient number to call for spine specialists

## 2025-01-14 NOTE — ASSESSMENT & PLAN NOTE
-Lipid panel obtained 1/13/25. T Chol 161, LDL 95. ASCVD risk <1%  - Pap smear: Due Aug 2025 Last Pap in 8/31/20, NILM &HPV neg  -BMI 20.96 with weight 98 lb.   -Tubal ligation for birth control.  Done in 2015   - STD testing today declined (HIV, RPR, Hep C, G/C)  -Glu 85 obtained 1/13/25   - A1c 5.1 obtained 1/13/25  - Flu and covid vaccine given. TDAP due 6/2025   - Tobacco use? Does not smoke  - Alcohol use? Does not drink

## 2025-05-15 NOTE — PROGRESS NOTES
A/Ox4, VSS on RA, Tele: ST (baseline),  IND, on total NPO for GI rest. CPAP overnight, +c/o abdominal pain and  nausea, diffuse tenderness at RUQ/LUQ-relieved w/ meds, +chills, warm packs/blankets provided- resolved. Pending GI consult today. Labs: CBC and BMP in AM.      ASSESSMENT: Peterson Cormier is a 40 year old female with past medical history significant for vitamin D deficiency who presents today for new patient evaluation of chronic pain involving the entire cervical, thoracic, and lumbar regions.  Pain began 1 year ago with no trauma.  An x-ray cervical spine showed straightening of upper cervical lordosis but was otherwise unremarkable.  Patient has not had any additional imaging of the spine.  It appears that she has thoracolumbar scoliosis on physical exam.  She has a large component of myofascial pain.  She may have underlying spondylosis.  She does not have any radicular symptoms.  She is neurologically intact.        PLAN:  A shared decision making model was used.  The patient's values and choices were respected.  The following represents what was discussed and decided upon by the physician assistant and the patient.      1.  DIAGNOSTIC TESTS:  - I reviewed the x-ray cervical spine from December 2024.  - I ordered scoliosis x-rays for further evaluation.  - If any area of her spine pain fails to improve with conservative treatment side recommend MRI scans for further evaluation.    2.  PHYSICAL THERAPY:  Discussed the importance of core strengthening, ROM, stretching exercises with the patient and how each of these entities is important in decreasing pain.  Explained to the patient that the purpose of physical therapy is to teach the patient a home exercise program.  These exercises need to be performed every day in order to decrease pain and prevent future occurrences of pain.   I entered a referral to physical therapy.  She will do the [facility] program for neck and back.    3.  MEDICATIONS:    -Cyclobenzaprine has not been helpful.  - I prescribed methocarbamol 500 mg at bedtime as needed instead.  - Also prescribed naproxen 500 milligrams twice daily as needed.  Instructed to take with food.  - Patient denies chance of pregnancy or plans to become pregnant.  She is not  breast-feeding.    4.  INTERVENTIONS: No interventions were ordered today.    5.  PATIENT EDUCATION: Patient is in agreement the above plan.  All questions were answered.    6.  FOLLOW-UP:   I will send the patient a [employer] message with her x-ray results.  Plan will be to follow-up with me in 6 weeks to monitor progress with physical therapy.  If she has questions or concerns, she should not hesitate to call.        SUBJECTIVE:  Peterson Cormier  Is a 40 year old female who presents today in consultation at the request of Tashi Benites PA-C for new patient evaluation of neck and back pain.  Patient reports that pain began about 1 year ago.  She denies any injury or event to cause the pain.  Pain is getting progressively worse.  She is having difficulty sleeping at night because of the pain.  She denies any prior history of neck or back pain.  She does report that she has noticed a curvature to her spine since middle school.  She never had any evaluation.    Patient complains of bilateral neck pain.  Pain begins at the craniocervical junction.  Pain then radiates down the neck into the bilateral upper trapezius muscles, all the way down both sides of the thoracic spine, and into the bilateral low back.  Both sides are equally painful.  She denies any pain down the arms or legs.  Denies any numbness, tingling, weakness down the arms or legs.  As mentioned above, pain is worse when she is lying in bed trying to fall asleep at night.  Pain does not wake her from sleep.  She denies any alleviating factors to the pain.  Denies loss of bowel or bladder control.  Denies recent fevers or chills.    Treatment to date:  - At home exercises from the Internet  - No formal physical therapy  - No chiropractic treatment  - No spine injections  - No spine surgeries  - Cyclobenzaprine not helpful  - Ibuprofen not helpful  - Tylenol not helpful    Current Outpatient Medications   Medication Sig Dispense Refill    cyclobenzaprine (FLEXERIL)  10 MG tablet Take 1 tablet (10 mg) by mouth nightly as needed for muscle spasms. 30 tablet 0     No current facility-administered medications for this visit.       No Known Allergies    Past Medical History:   Diagnosis Date    Chickenpox childhood    patient reports h/o chickenpox as small child    Immune to hepatitis A 8/3/12    - 8/3/12 prenatal: Hep A IMMUNE (unknown whether h/o vaccination vs immune by h/o disease)    Immune to varicella 8/3/12    - 8/3/12 prenatal: Varicella IMMUNE    Menarche 17 y/o    Menarche 17 y/o. Regular cycles q 30 days; flow 7 days (days 1-3 heavy, last 4 days light)         [facility]     - 12 prenatal: Strongyloides Ab NEG, quantiferon NEG    Type A blood, Rh positive 8/3/12, 1/12/15    - 8/3/12, 1/12/15 prenatal: blood type A POS, Ab Screen NEG        Patient Active Problem List   Diagnosis    Vitamin D Deficiency    History of anemia    Pseudoangiomatous stromal hyperplasia of breast    Routine general medical examination at a health care facility    Cervical radiculopathy       Past Surgical History:   Procedure Laterality Date    BIOPSY OF BREAST, INCISIONAL Left 10/30/2020    Procedure: Left Breast Biopsy;  Surgeon: Heather Reyes MD;  Location: [facility];  Service: General    POSTPARTUM TUBAL LIGATION Bilateral 2015    Procedure: TUBAL LIGATION POST PARTUM, BILATERAL;  Surgeon: Ginna Brennan MD;  Location: [facility];  Service:     TUBAL LIGATION Bilateral 8/13/15    - 8/13/15 Dr Brennan, [facility]: postpartum BTL, no complications    US BREAST CORE BIOPSY LEFT Left 2020       Family History   Problem Relation Age of Onset    Diabetes Father     Hypertension Father     Hyperlipidemia Father     No Known Problems Daughter          13    No Known Problems Son          10/4/10    No Known Problems Son          09    No Known Problems Son          08    Other - See Comments Other         - 2013: No  known family h/o DM, heart disease, cancers, or deaths < 51 y/o. Parents, 5 brothers/1 sister (she is #4 of 7), 4 children alive & well         Breast Cancer No family hx of        Social history: Patient is .  She works in medical assembly.  She denies tobacco, alcohol, recreational drug use.    ROS: Positive for headache, ringing in ears, joint pain, muscle pain, muscle fatigue.  Specifically negative for dysphagia, imbalance, fine motor skill difficulties, bowel/bladder dysfunction, fevers,chills, appetite changes, unexplained weight loss.   Otherwise 13 systems reviewed are negative.  Please see the patient's intake questionnaire from today for details.      OBJECTIVE:  PHYSICAL EXAMINATION:  CONSTITUTIONAL:  Vital signs as above.  No acute distress.  The patient is well nourished and well groomed.  PSYCHIATRIC:  The patient is awake, alert, oriented to person, place, time and answering questions appropriately with clear speech.    HEENT:  Normocephalic, atraumatic.  Sclera clear.    SKIN:  Skin over the face, bilateral upper extremities, and neck is clean, dry, intact without rashes.  LYMPH NODES:  No palpable or tender anterior/posterior cervical, submandibular, or supraclavicular lymph nodes.    MUSCLE STRENGTH:  5/5 strength for the bilateral shoulder abductors, elbow flexors/extensors, wrist extensors, finger flexors/abductors, hip flexors, knee flexors/extensors, ankle dorsi/plantar flexors, EHL.  NEURO:  CN III-XII are grossly intact.  1-2+ symmetric biceps, brachioradialis, triceps, patellar, and Achilles reflexes bilaterally.  Sensation to light touch is intact bilateral upper and lower extremities throughout.  Negative Restrepo's bilaterally.    VASCULAR:  2/4 radial pulses bilaterally.  Warm upper limbs bilaterally.  Capillary refill in the upper extremities is less than 1 second.  MUSCULOSKELETAL: Tender to palpation bilateral cervical paraspinous muscles, bilateral thoracic paraspinous  muscles.  No tenderness to palpation bilateral lumbar paraspinous muscles.  Patient appears to have a left convex thoracolumbar curvature.  Range of motion of the thoracolumbar spine is within normal limits.  Cervical range of motion is within normal limits.    RESULTS:    I reviewed the x-ray cervical spine dated December 17, 2024.  This shows no significant degenerative changes.  There is straightening of upper cervical lordosis.

## 2025-05-19 ENCOUNTER — OFFICE VISIT (OUTPATIENT)
Dept: PHYSICAL MEDICINE AND REHAB | Facility: CLINIC | Age: 40
End: 2025-05-19
Attending: PHYSICIAN ASSISTANT
Payer: COMMERCIAL

## 2025-05-19 ENCOUNTER — HOSPITAL ENCOUNTER (OUTPATIENT)
Dept: RADIOLOGY | Facility: CLINIC | Age: 40
Discharge: HOME | End: 2025-05-19
Attending: PHYSICIAN ASSISTANT | Admitting: PHYSICIAN ASSISTANT
Payer: COMMERCIAL

## 2025-05-19 VITALS
WEIGHT: 100.6 LBS | HEIGHT: 57 IN | SYSTOLIC BLOOD PRESSURE: 100 MMHG | HEART RATE: 66 BPM | DIASTOLIC BLOOD PRESSURE: 65 MMHG | BODY MASS INDEX: 21.7 KG/M2

## 2025-05-19 DIAGNOSIS — M54.6 PAIN IN THORACIC SPINE: ICD-10-CM

## 2025-05-19 DIAGNOSIS — M79.18 MYOFASCIAL PAIN: ICD-10-CM

## 2025-05-19 DIAGNOSIS — M54.50 LUMBAR SPINE PAIN: ICD-10-CM

## 2025-05-19 DIAGNOSIS — M41.9 SCOLIOSIS OF THORACOLUMBAR SPINE, UNSPECIFIED SCOLIOSIS TYPE: ICD-10-CM

## 2025-05-19 DIAGNOSIS — M54.2 CERVICALGIA: Primary | ICD-10-CM

## 2025-05-19 PROCEDURE — 72082 X-RAY EXAM ENTIRE SPI 2/3 VW: CPT

## 2025-05-19 RX ORDER — NAPROXEN 500 MG/1
500 TABLET ORAL 2 TIMES DAILY PRN
Qty: 60 TABLET | Refills: 1 | Status: SHIPPED | OUTPATIENT
Start: 2025-05-19

## 2025-05-19 RX ORDER — METHOCARBAMOL 500 MG/1
500 TABLET, FILM COATED ORAL
Qty: 30 TABLET | Refills: 1 | Status: SHIPPED | OUTPATIENT
Start: 2025-05-19

## 2025-05-19 ASSESSMENT — PAIN SCALES - GENERAL: PAINLEVEL_OUTOF10: SEVERE PAIN (7)

## 2025-05-19 NOTE — PATIENT INSTRUCTIONS
[facility] Scheduling    Please call [phone] to schedule your image(s) (select option#1).     An order for physicaltherapy has been provided today.  Someone will call you to schedule physical therapy or you can call [phone] to schedule physical therapy.  It will be very important for you to do your physical therapy exercises on aregular basis to decrease your pain and prevent future flares of pain.  Chronic pain involving the entire cervical, thoracic, and lumbar regions   3-5x/week

## 2025-05-19 NOTE — LETTER
5/19/2025      Peterson Cormier  [address] [address]      Dear Colleague,    Thank you for referring your patient, Peterson Cormier, to the [facility]. Please see a copy of my visit note below.    ASSESSMENT: Peterson Cormier is a 40 year old female with past medical history significant for vitamin D deficiency who presents today for new patient evaluation of chronic pain involving the entire cervical, thoracic, and lumbar regions.  Pain began 1 year ago with no trauma.  An x-ray cervical spine showed straightening of upper cervical lordosis but was otherwise unremarkable.  Patient has not had any additional imaging of the spine.  It appears that she has thoracolumbar scoliosis on physical exam.  She has a large component of myofascial pain.  She may have underlying spondylosis.  She does not have any radicular symptoms.  She is neurologically intact.        PLAN:  A shared decision making model was used.  The patient's values and choices were respected.  The following represents what was discussed and decided upon by the physician assistant and the patient.      1.  DIAGNOSTIC TESTS:  - I reviewed the x-ray cervical spine from December 2024.  - I ordered scoliosis x-rays for further evaluation.  - If any area of her spine pain fails to improve with conservative treatment side recommend MRI scans for further evaluation.    2.  PHYSICAL THERAPY:  Discussed the importance of core strengthening, ROM, stretching exercises with the patient and how each of these entities is important in decreasing pain.  Explained to the patient that the purpose of physical therapy is to teach the patient a home exercise program.  These exercises need to be performed every day in order to decrease pain and prevent future occurrences of pain.   I entered a referral to physical therapy.  She will do the [facility] program for neck and back.    3.  MEDICATIONS:    -Cyclobenzaprine has not been helpful.  - I prescribed  methocarbamol 500 mg at bedtime as needed instead.  - Also prescribed naproxen 500 milligrams twice daily as needed.  Instructed to take with food.  - Patient denies chance of pregnancy or plans to become pregnant.  She is not breast-feeding.    4.  INTERVENTIONS: No interventions were ordered today.    5.  PATIENT EDUCATION: Patient is in agreement the above plan.  All questions were answered.    6.  FOLLOW-UP:   I will send the patient a [employer] message with her x-ray results.  Plan will be to follow-up with me in 6 weeks to monitor progress with physical therapy.  If she has questions or concerns, she should not hesitate to call.        SUBJECTIVE:  Peterson Cormier  Is a 40 year old female who presents today in consultation at the request of Tashi Benites PA-C for new patient evaluation of neck and back pain.  Patient reports that pain began about 1 year ago.  She denies any injury or event to cause the pain.  Pain is getting progressively worse.  She is having difficulty sleeping at night because of the pain.  She denies any prior history of neck or back pain.  She does report that she has noticed a curvature to her spine since middle school.  She never had any evaluation.    Patient complains of bilateral neck pain.  Pain begins at the craniocervical junction.  Pain then radiates down the neck into the bilateral upper trapezius muscles, all the way down both sides of the thoracic spine, and into the bilateral low back.  Both sides are equally painful.  She denies any pain down the arms or legs.  Denies any numbness, tingling, weakness down the arms or legs.  As mentioned above, pain is worse when she is lying in bed trying to fall asleep at night.  Pain does not wake her from sleep.  She denies any alleviating factors to the pain.  Denies loss of bowel or bladder control.  Denies recent fevers or chills.    Treatment to date:  - At home exercises from the Internet  - No formal physical therapy  - No chiropractic  treatment  - No spine injections  - No spine surgeries  - Cyclobenzaprine not helpful  - Ibuprofen not helpful  - Tylenol not helpful    Current Outpatient Medications   Medication Sig Dispense Refill     cyclobenzaprine (FLEXERIL) 10 MG tablet Take 1 tablet (10 mg) by mouth nightly as needed for muscle spasms. 30 tablet 0     No current facility-administered medications for this visit.       No Known Allergies    Past Medical History:   Diagnosis Date     Chickenpox childhood    patient reports h/o chickenpox as small child     Immune to hepatitis A 8/3/12    - 8/3/12 prenatal: Hep A IMMUNE (unknown whether h/o vaccination vs immune by h/o disease)     Immune to varicella 8/3/12    - 8/3/12 prenatal: Varicella IMMUNE     Menarche 17 y/o    Menarche 17 y/o. Regular cycles q 30 days; flow 7 days (days 1-3 heavy, last 4 days light)          [facility] examination     - 9/5/12 prenatal: Strongyloides Ab NEG, quantiferon NEG     Type A blood, Rh positive 8/3/12, 1/12/15    - 8/3/12, 1/12/15 prenatal: blood type A POS, Ab Screen NEG        Patient Active Problem List   Diagnosis     Vitamin D Deficiency     History of anemia     Pseudoangiomatous stromal hyperplasia of breast     Routine general medical examination at a health care facility     Cervical radiculopathy       Past Surgical History:   Procedure Laterality Date     BIOPSY OF BREAST, INCISIONAL Left 10/30/2020    Procedure: Left Breast Biopsy;  Surgeon: Heather Reyes MD;  Location: [facility];  Service: General     POSTPARTUM TUBAL LIGATION Bilateral 8/13/2015    Procedure: TUBAL LIGATION POST PARTUM, BILATERAL;  Surgeon: Ginna Brennan MD;  [facility];  Service:      TUBAL LIGATION Bilateral 8/13/15    - 8/13/15 Dr Brennan [facility]: postpartum BTL, no complications     US BREAST CORE BIOPSY LEFT Left 9/16/2020       Family History   Problem Relation Age of Onset     Diabetes Father      Hypertension Father       Hyperlipidemia Father      No Known Problems Daughter          13     No Known Problems Son          10/4/10     No Known Problems Son          09     No Known Problems Son          08     Other - See Comments Other         - 2013: No known family h/o DM, heart disease, cancers, or deaths < 51 y/o. Parents, 5 brothers/1 sister (she is #4 of 7), 4 children alive & well          Breast Cancer No family hx of        Social history: Patient is .  She works in medical assembly.  She denies tobacco, alcohol, recreational drug use.    ROS: Positive for headache, ringing in ears, joint pain, muscle pain, muscle fatigue.  Specifically negative for dysphagia, imbalance, fine motor skill difficulties, bowel/bladder dysfunction, fevers,chills, appetite changes, unexplained weight loss.   Otherwise 13 systems reviewed are negative.  Please see the patient's intake questionnaire from today for details.      OBJECTIVE:  PHYSICAL EXAMINATION:  CONSTITUTIONAL:  Vital signs as above.  No acute distress.  The patient is well nourished and well groomed.  PSYCHIATRIC:  The patient is awake, alert, oriented to person, place, time and answering questions appropriately with clear speech.    HEENT:  Normocephalic, atraumatic.  Sclera clear.    SKIN:  Skin over the face, bilateral upper extremities, and neck is clean, dry, intact without rashes.  LYMPH NODES:  No palpable or tender anterior/posterior cervical, submandibular, or supraclavicular lymph nodes.    MUSCLE STRENGTH:  5/5 strength for the bilateral shoulder abductors, elbow flexors/extensors, wrist extensors, finger flexors/abductors, hip flexors, knee flexors/extensors, ankle dorsi/plantar flexors, EHL.  NEURO:  CN III-XII are grossly intact.  1-2+ symmetric biceps, brachioradialis, triceps, patellar, and Achilles reflexes bilaterally.  Sensation to light touch is intact bilateral upper and lower extremities throughout.  Negative Restrepo's  bilaterally.    VASCULAR:  2/4 radial pulses bilaterally.  Warm upper limbs bilaterally.  Capillary refill in the upper extremities is less than 1 second.  MUSCULOSKELETAL: Tender to palpation bilateral cervical paraspinous muscles, bilateral thoracic paraspinous muscles.  No tenderness to palpation bilateral lumbar paraspinous muscles.  Patient appears to have a left convex thoracolumbar curvature.  Range of motion of the thoracolumbar spine is within normal limits.  Cervical range of motion is within normal limits.    RESULTS:    I reviewed the x-ray cervical spine dated December 17, 2024.  This shows no significant degenerative changes.  There is straightening of upper cervical lordosis.      Again, thank you for allowing me to participate in the care of your patient.        Sincerely,        Sierra Dobbins PA-C    Electronically signed

## 2025-05-20 ENCOUNTER — RESULTS FOLLOW-UP (OUTPATIENT)
Dept: PHYSICAL MEDICINE AND REHAB | Facility: CLINIC | Age: 40
End: 2025-05-20

## 2025-07-15 DIAGNOSIS — M54.50 LUMBAR SPINE PAIN: ICD-10-CM

## 2025-07-15 DIAGNOSIS — M79.18 MYOFASCIAL PAIN: ICD-10-CM

## 2025-07-15 DIAGNOSIS — M54.2 CERVICALGIA: ICD-10-CM

## 2025-07-15 DIAGNOSIS — M54.6 PAIN IN THORACIC SPINE: ICD-10-CM

## 2025-07-15 RX ORDER — NAPROXEN 500 MG/1
TABLET ORAL
Qty: 60 TABLET | Refills: 1 | Status: SHIPPED | OUTPATIENT
Start: 2025-07-15

## 2025-07-15 RX ORDER — METHOCARBAMOL 500 MG/1
500 TABLET, FILM COATED ORAL
Qty: 30 TABLET | Refills: 1 | Status: SHIPPED | OUTPATIENT
Start: 2025-07-15

## 2025-07-15 NOTE — TELEPHONE ENCOUNTER
Last appointment: 05/19/2025  Next appointment: None    Notes/Comments:      Rx request(s) has been reviewed.